# Patient Record
Sex: FEMALE | Race: ASIAN | NOT HISPANIC OR LATINO | URBAN - METROPOLITAN AREA
[De-identification: names, ages, dates, MRNs, and addresses within clinical notes are randomized per-mention and may not be internally consistent; named-entity substitution may affect disease eponyms.]

---

## 2017-08-11 ENCOUNTER — OUTPATIENT (OUTPATIENT)
Dept: OUTPATIENT SERVICES | Facility: HOSPITAL | Age: 57
LOS: 1 days | End: 2017-08-11

## 2017-08-11 DIAGNOSIS — Z00.8 ENCOUNTER FOR OTHER GENERAL EXAMINATION: ICD-10-CM

## 2017-08-31 PROBLEM — Z00.00 ENCOUNTER FOR PREVENTIVE HEALTH EXAMINATION: Status: ACTIVE | Noted: 2017-08-31

## 2017-09-21 ENCOUNTER — APPOINTMENT (OUTPATIENT)
Dept: MAMMOGRAPHY | Facility: IMAGING CENTER | Age: 57
End: 2017-09-21
Payer: COMMERCIAL

## 2017-09-21 ENCOUNTER — OUTPATIENT (OUTPATIENT)
Dept: OUTPATIENT SERVICES | Facility: HOSPITAL | Age: 57
LOS: 1 days | End: 2017-09-21
Payer: COMMERCIAL

## 2017-09-21 ENCOUNTER — RESULT REVIEW (OUTPATIENT)
Age: 57
End: 2017-09-21

## 2017-09-21 DIAGNOSIS — Z00.8 ENCOUNTER FOR OTHER GENERAL EXAMINATION: ICD-10-CM

## 2017-09-21 PROCEDURE — 19082 BX BREAST ADD LESION STRTCTC: CPT

## 2017-09-21 PROCEDURE — 77065 DX MAMMO INCL CAD UNI: CPT

## 2017-09-21 PROCEDURE — 88305 TISSUE EXAM BY PATHOLOGIST: CPT | Mod: 26

## 2017-09-21 PROCEDURE — G0206: CPT | Mod: 26,LT

## 2017-09-21 PROCEDURE — A4648: CPT

## 2017-09-21 PROCEDURE — 19082 BX BREAST ADD LESION STRTCTC: CPT | Mod: LT

## 2017-09-21 PROCEDURE — 19081 BX BREAST 1ST LESION STRTCTC: CPT

## 2017-09-21 PROCEDURE — 19081 BX BREAST 1ST LESION STRTCTC: CPT | Mod: LT

## 2017-09-21 PROCEDURE — 88305 TISSUE EXAM BY PATHOLOGIST: CPT

## 2017-10-03 LAB — SURGICAL PATHOLOGY STUDY: SIGNIFICANT CHANGE UP

## 2018-03-19 ENCOUNTER — RESULT REVIEW (OUTPATIENT)
Age: 58
End: 2018-03-19

## 2018-10-11 ENCOUNTER — APPOINTMENT (OUTPATIENT)
Dept: RHEUMATOLOGY | Facility: CLINIC | Age: 58
End: 2018-10-11
Payer: COMMERCIAL

## 2018-10-11 VITALS
BODY MASS INDEX: 30.18 KG/M2 | RESPIRATION RATE: 17 BRPM | HEIGHT: 62 IN | DIASTOLIC BLOOD PRESSURE: 70 MMHG | WEIGHT: 164 LBS | TEMPERATURE: 98 F | OXYGEN SATURATION: 98 % | SYSTOLIC BLOOD PRESSURE: 114 MMHG | HEART RATE: 61 BPM

## 2018-10-11 DIAGNOSIS — Z56.0 UNEMPLOYMENT, UNSPECIFIED: ICD-10-CM

## 2018-10-11 DIAGNOSIS — Z83.79 FAMILY HISTORY OF OTHER DISEASES OF THE DIGESTIVE SYSTEM: ICD-10-CM

## 2018-10-11 DIAGNOSIS — Z86.000 PERSONAL HISTORY OF IN-SITU NEOPLASM OF BREAST: ICD-10-CM

## 2018-10-11 PROCEDURE — 90686 IIV4 VACC NO PRSV 0.5 ML IM: CPT

## 2018-10-11 PROCEDURE — 99245 OFF/OP CONSLTJ NEW/EST HI 55: CPT | Mod: 25

## 2018-10-11 PROCEDURE — G0008: CPT

## 2018-10-11 RX ORDER — CELECOXIB 200 MG/1
200 CAPSULE ORAL
Refills: 0 | Status: COMPLETED | COMMUNITY

## 2018-10-11 RX ORDER — ELECTROLYTES/DEXTROSE
SOLUTION, ORAL ORAL
Refills: 0 | Status: ACTIVE | COMMUNITY
Start: 2018-10-11

## 2018-10-11 RX ORDER — METHOTREXATE 2.5 MG/1
2.5 TABLET ORAL
Refills: 0 | Status: COMPLETED | COMMUNITY

## 2018-10-11 RX ORDER — MV-MIN/FOLIC/VIT K/LYCOP/COQ10 200-100MCG
CAPSULE ORAL
Refills: 0 | Status: COMPLETED | COMMUNITY

## 2018-10-11 RX ORDER — FOLIC ACID 1 MG/1
1 TABLET ORAL
Refills: 0 | Status: COMPLETED | COMMUNITY

## 2018-10-11 SDOH — ECONOMIC STABILITY - INCOME SECURITY: UNEMPLOYMENT, UNSPECIFIED: Z56.0

## 2018-10-12 LAB
ALBUMIN SERPL ELPH-MCNC: 4.5 G/DL
ALP BLD-CCNC: 74 U/L
ALT SERPL-CCNC: 17 U/L
ANION GAP SERPL CALC-SCNC: 14 MMOL/L
AST SERPL-CCNC: 19 U/L
BASOPHILS # BLD AUTO: 0.02 K/UL
BASOPHILS NFR BLD AUTO: 0.3 %
BILIRUB SERPL-MCNC: 0.2 MG/DL
BUN SERPL-MCNC: 15 MG/DL
CALCIUM SERPL-MCNC: 9.4 MG/DL
CCP AB SER IA-ACNC: >250 UNITS
CHLORIDE SERPL-SCNC: 100 MMOL/L
CO2 SERPL-SCNC: 26 MMOL/L
CREAT SERPL-MCNC: 0.57 MG/DL
CRP SERPL-MCNC: 1.27 MG/DL
EOSINOPHIL # BLD AUTO: 0.15 K/UL
EOSINOPHIL NFR BLD AUTO: 2 %
ERYTHROCYTE [SEDIMENTATION RATE] IN BLOOD BY WESTERGREN METHOD: 63 MM/HR
GLUCOSE SERPL-MCNC: 68 MG/DL
HBV CORE IGG+IGM SER QL: NONREACTIVE
HBV SURFACE AB SER QL: NONREACTIVE
HBV SURFACE AG SER QL: NONREACTIVE
HCT VFR BLD CALC: 41.1 %
HCV AB SER QL: NONREACTIVE
HCV S/CO RATIO: 0.16 S/CO
HGB BLD-MCNC: 13 G/DL
IMM GRANULOCYTES NFR BLD AUTO: 0.1 %
LYMPHOCYTES # BLD AUTO: 3.31 K/UL
LYMPHOCYTES NFR BLD AUTO: 44.4 %
MAN DIFF?: NORMAL
MCHC RBC-ENTMCNC: 26.5 PG
MCHC RBC-ENTMCNC: 31.6 GM/DL
MCV RBC AUTO: 83.7 FL
MONOCYTES # BLD AUTO: 0.77 K/UL
MONOCYTES NFR BLD AUTO: 10.3 %
NEUTROPHILS # BLD AUTO: 3.19 K/UL
NEUTROPHILS NFR BLD AUTO: 42.9 %
PLATELET # BLD AUTO: 321 K/UL
POTASSIUM SERPL-SCNC: 5.1 MMOL/L
PROT SERPL-MCNC: 7.6 G/DL
RBC # BLD: 4.91 M/UL
RBC # FLD: 15.7 %
RF+CCP IGG SER-IMP: ABNORMAL
SODIUM SERPL-SCNC: 140 MMOL/L
WBC # FLD AUTO: 7.45 K/UL

## 2018-10-17 LAB
ANA PAT FLD IF-IMP: ABNORMAL
ANA SER IF-ACNC: ABNORMAL

## 2019-01-02 ENCOUNTER — FORM ENCOUNTER (OUTPATIENT)
Age: 59
End: 2019-01-02

## 2019-01-03 ENCOUNTER — OUTPATIENT (OUTPATIENT)
Dept: OUTPATIENT SERVICES | Facility: HOSPITAL | Age: 59
LOS: 1 days | End: 2019-01-03
Payer: COMMERCIAL

## 2019-01-03 ENCOUNTER — APPOINTMENT (OUTPATIENT)
Dept: MAMMOGRAPHY | Facility: CLINIC | Age: 59
End: 2019-01-03
Payer: COMMERCIAL

## 2019-01-03 DIAGNOSIS — Z00.00 ENCOUNTER FOR GENERAL ADULT MEDICAL EXAMINATION WITHOUT ABNORMAL FINDINGS: ICD-10-CM

## 2019-01-03 PROCEDURE — 73130 X-RAY EXAM OF HAND: CPT | Mod: 26,50

## 2019-01-03 PROCEDURE — G0279: CPT | Mod: 26

## 2019-01-03 PROCEDURE — 73630 X-RAY EXAM OF FOOT: CPT

## 2019-01-03 PROCEDURE — G0279: CPT

## 2019-01-03 PROCEDURE — 73630 X-RAY EXAM OF FOOT: CPT | Mod: 26,50

## 2019-01-03 PROCEDURE — 77066 DX MAMMO INCL CAD BI: CPT | Mod: 26

## 2019-01-03 PROCEDURE — 73130 X-RAY EXAM OF HAND: CPT

## 2019-01-03 PROCEDURE — 77066 DX MAMMO INCL CAD BI: CPT

## 2019-01-15 ENCOUNTER — APPOINTMENT (OUTPATIENT)
Dept: RHEUMATOLOGY | Facility: CLINIC | Age: 59
End: 2019-01-15
Payer: COMMERCIAL

## 2019-01-15 VITALS
HEART RATE: 70 BPM | RESPIRATION RATE: 16 BRPM | DIASTOLIC BLOOD PRESSURE: 78 MMHG | TEMPERATURE: 97.8 F | WEIGHT: 176 LBS | BODY MASS INDEX: 33.23 KG/M2 | HEIGHT: 61 IN | OXYGEN SATURATION: 98 % | SYSTOLIC BLOOD PRESSURE: 132 MMHG

## 2019-01-15 LAB
ALBUMIN SERPL ELPH-MCNC: 4 G/DL
ALP BLD-CCNC: 74 U/L
ALT SERPL-CCNC: 15 U/L
ANION GAP SERPL CALC-SCNC: 9 MMOL/L
AST SERPL-CCNC: 13 U/L
BILIRUB SERPL-MCNC: 0.2 MG/DL
BUN SERPL-MCNC: 16 MG/DL
CALCIUM SERPL-MCNC: 9.4 MG/DL
CHLORIDE SERPL-SCNC: 103 MMOL/L
CO2 SERPL-SCNC: 29 MMOL/L
CREAT SERPL-MCNC: 0.5 MG/DL
CRP SERPL-MCNC: 1.63 MG/DL
GLUCOSE SERPL-MCNC: 106 MG/DL
POTASSIUM SERPL-SCNC: 5 MMOL/L
PROT SERPL-MCNC: 7.3 G/DL
SODIUM SERPL-SCNC: 141 MMOL/L

## 2019-01-15 PROCEDURE — 99214 OFFICE O/P EST MOD 30 MIN: CPT | Mod: 25

## 2019-01-15 PROCEDURE — 36415 COLL VENOUS BLD VENIPUNCTURE: CPT

## 2019-01-15 NOTE — HISTORY OF PRESENT ILLNESS
[Arthralgias] : arthralgias [FreeTextEntry1] : Feeling fine since last visit.  No joint pain/swelling.  (+)AM stiffness, usually lasting up to 5-10 minutes.  No new complaints. [Anorexia] : no anorexia [Weight Loss] : no weight loss [Malaise] : no malaise [Fever] : no fever [Chills] : no chills [Fatigue] : no fatigue [Malar Facial Rash] : no malar facial rash [Skin Lesions] : no lesions [Skin Nodules] : no skin nodules [Oral Ulcers] : no oral ulcers [Cough] : no cough [Dry Mouth] : no dry mouth [Dysphagia] : no dysphagia [Shortness of Breath] : no shortness of breath [Chest Pain] : no chest pain [Joint Swelling] : no joint swelling [Joint Warmth] : no joint warmth [Joint Deformity] : no joint deformity [Decreased ROM] : no decreased range of motion [Morning Stiffness] : no morning stiffness [Falls] : no falls [Difficulty Standing] : no difficulty standing [Difficulty Walking] : no difficulty walking [Dyspnea] : no dyspnea [Myalgias] : no myalgias [Muscle Weakness] : no muscle weakness [Muscle Spasms] : no muscle spasms [Muscle Cramping] : no muscle cramping [Visual Changes] : no visual changes [Eye Pain] : no eye pain [Eye Redness] : no eye redness [Dry Eyes] : no dry eyes

## 2019-01-15 NOTE — PHYSICAL EXAM
[General Appearance - Alert] : alert [General Appearance - In No Acute Distress] : in no acute distress [Sclera] : the sclera and conjunctiva were normal [Outer Ear] : the ears and nose were normal in appearance [Oropharynx] : the oropharynx was normal [Neck Appearance] : the appearance of the neck was normal [Neck Cervical Mass (___cm)] : no neck mass was observed [Jugular Venous Distention Increased] : there was no jugular-venous distention [Thyroid Diffuse Enlargement] : the thyroid was not enlarged [Thyroid Nodule] : there were no palpable thyroid nodules [Auscultation Breath Sounds / Voice Sounds] : lungs were clear to auscultation bilaterally [Heart Rate And Rhythm] : heart rate was normal and rhythm regular [Heart Sounds] : normal S1 and S2 [Heart Sounds Gallop] : no gallops [Murmurs] : no murmurs [Heart Sounds Pericardial Friction Rub] : no pericardial rub [Edema] : there was no peripheral edema [Bowel Sounds] : normal bowel sounds [Abdomen Soft] : soft [Abdomen Tenderness] : non-tender [Abdomen Mass (___ Cm)] : no abdominal mass palpated [Cervical Lymph Nodes Enlarged Posterior Bilaterally] : posterior cervical [Cervical Lymph Nodes Enlarged Anterior Bilaterally] : anterior cervical [Supraclavicular Lymph Nodes Enlarged Bilaterally] : supraclavicular [No Spinal Tenderness] : no spinal tenderness [Skin Color & Pigmentation] : normal skin color and pigmentation [Skin Turgor] : normal skin turgor [] : no rash [No Focal Deficits] : no focal deficits [Oriented To Time, Place, And Person] : oriented to person, place, and time [Impaired Insight] : insight and judgment were intact [Affect] : the affect was normal [FreeTextEntry1] : No synovitis, right shoulder w/ mild pain upon internal and external rotation, (+)impingement;  otherwise, full ROM in all joints\par

## 2019-01-15 NOTE — ASSESSMENT
[FreeTextEntry1] : 58 year old female with long-standing RA (+RF, +CCP), well controlled on MTX, though w/ elevated ESR/CRP. Also found to have borderlline DAVIN, though no si/sx of other CTD.  Also RTC tendonopathy of the right shoulder.\par   - Cont MTX 12.5mg weekly, folic acid 1mg daily.\par   - Hepatitis panel negative 10/18.\par   - Flu vaccine(10/18) UTD.  Will plan to administer Prevnar at next visit.\par   - shoulder exercises\par   - Cont Celebrex prn.\par   - Check labs, including serologies.

## 2019-01-16 LAB
BASOPHILS # BLD AUTO: 0.02 K/UL
BASOPHILS NFR BLD AUTO: 0.3 %
C3 SERPL-MCNC: 183 MG/DL
C4 SERPL-MCNC: 17 MG/DL
EOSINOPHIL # BLD AUTO: 0.09 K/UL
EOSINOPHIL NFR BLD AUTO: 1.5 %
ERYTHROCYTE [SEDIMENTATION RATE] IN BLOOD BY WESTERGREN METHOD: 94 MM/HR
HCT VFR BLD CALC: 40.3 %
HGB BLD-MCNC: 12.7 G/DL
IMM GRANULOCYTES NFR BLD AUTO: 0.2 %
LYMPHOCYTES # BLD AUTO: 2.48 K/UL
LYMPHOCYTES NFR BLD AUTO: 42.7 %
MAN DIFF?: NORMAL
MCHC RBC-ENTMCNC: 26.8 PG
MCHC RBC-ENTMCNC: 31.5 GM/DL
MCV RBC AUTO: 85.2 FL
MONOCYTES # BLD AUTO: 0.59 K/UL
MONOCYTES NFR BLD AUTO: 10.2 %
NEUTROPHILS # BLD AUTO: 2.62 K/UL
NEUTROPHILS NFR BLD AUTO: 45.1 %
PLATELET # BLD AUTO: 310 K/UL
RBC # BLD: 4.73 M/UL
RBC # FLD: 15.7 %
THYROGLOB AB SERPL-ACNC: <20 IU/ML
THYROPEROXIDASE AB SERPL IA-ACNC: <10 IU/ML
WBC # FLD AUTO: 5.81 K/UL

## 2019-01-17 LAB
DSDNA AB SER-ACNC: <12 IU/ML
ENA RNP AB SER IA-ACNC: <0.2 AL
ENA SM AB SER IA-ACNC: <0.2 AL
ENA SS-A AB SER IA-ACNC: <0.2 AL
ENA SS-B AB SER IA-ACNC: <0.2 AL

## 2019-02-07 ENCOUNTER — TRANSCRIPTION ENCOUNTER (OUTPATIENT)
Age: 59
End: 2019-02-07

## 2019-02-21 ENCOUNTER — OUTPATIENT (OUTPATIENT)
Dept: OUTPATIENT SERVICES | Facility: HOSPITAL | Age: 59
LOS: 1 days | End: 2019-02-21
Payer: COMMERCIAL

## 2019-02-21 ENCOUNTER — APPOINTMENT (OUTPATIENT)
Dept: ULTRASOUND IMAGING | Facility: CLINIC | Age: 59
End: 2019-02-21
Payer: COMMERCIAL

## 2019-02-21 DIAGNOSIS — K11.1 HYPERTROPHY OF SALIVARY GLAND: ICD-10-CM

## 2019-02-21 DIAGNOSIS — R05 COUGH: ICD-10-CM

## 2019-02-21 DIAGNOSIS — Z00.8 ENCOUNTER FOR OTHER GENERAL EXAMINATION: ICD-10-CM

## 2019-02-21 PROCEDURE — 76536 US EXAM OF HEAD AND NECK: CPT | Mod: 26

## 2019-02-21 PROCEDURE — 76536 US EXAM OF HEAD AND NECK: CPT

## 2019-02-21 PROCEDURE — 71046 X-RAY EXAM CHEST 2 VIEWS: CPT

## 2019-02-21 PROCEDURE — 71046 X-RAY EXAM CHEST 2 VIEWS: CPT | Mod: 26

## 2019-03-26 ENCOUNTER — RX RENEWAL (OUTPATIENT)
Age: 59
End: 2019-03-26

## 2019-04-16 ENCOUNTER — APPOINTMENT (OUTPATIENT)
Dept: RHEUMATOLOGY | Facility: CLINIC | Age: 59
End: 2019-04-16
Payer: COMMERCIAL

## 2019-04-16 VITALS
RESPIRATION RATE: 17 BRPM | HEIGHT: 61 IN | SYSTOLIC BLOOD PRESSURE: 120 MMHG | HEART RATE: 62 BPM | WEIGHT: 170 LBS | BODY MASS INDEX: 32.1 KG/M2 | TEMPERATURE: 98 F | OXYGEN SATURATION: 98 % | DIASTOLIC BLOOD PRESSURE: 80 MMHG

## 2019-04-16 LAB
BASOPHILS # BLD AUTO: 0.02 K/UL
BASOPHILS NFR BLD AUTO: 0.3 %
EOSINOPHIL # BLD AUTO: 0.09 K/UL
EOSINOPHIL NFR BLD AUTO: 1.5 %
HCT VFR BLD CALC: 41.7 %
HGB BLD-MCNC: 12.4 G/DL
IMM GRANULOCYTES NFR BLD AUTO: 0.3 %
LYMPHOCYTES # BLD AUTO: 2.62 K/UL
LYMPHOCYTES NFR BLD AUTO: 44.1 %
MAN DIFF?: NORMAL
MCHC RBC-ENTMCNC: 26.4 PG
MCHC RBC-ENTMCNC: 29.7 GM/DL
MCV RBC AUTO: 88.9 FL
MONOCYTES # BLD AUTO: 0.62 K/UL
MONOCYTES NFR BLD AUTO: 10.4 %
NEUTROPHILS # BLD AUTO: 2.57 K/UL
NEUTROPHILS NFR BLD AUTO: 43.4 %
PLATELET # BLD AUTO: 279 K/UL
RBC # BLD: 4.69 M/UL
RBC # FLD: 15.6 %
WBC # FLD AUTO: 5.94 K/UL

## 2019-04-16 PROCEDURE — 36415 COLL VENOUS BLD VENIPUNCTURE: CPT

## 2019-04-16 PROCEDURE — 99215 OFFICE O/P EST HI 40 MIN: CPT | Mod: 25

## 2019-04-16 NOTE — HISTORY OF PRESENT ILLNESS
[Arthralgias] : arthralgias [Anorexia] : no anorexia [FreeTextEntry1] : Pt c/o pain in her B/L heels for the past month.  The pain occurs only with weight-bearing, worst in the mornings.  Otherwise, feeling fine.  No joint pain/swelling.  AM stiffness now occurs occasionally, lasting about 5 minutes.  \par Feeling fine since last visit.  No joint pain/swelling.  (+)AM stiffness, usually lasting up to 5-10 minutes.  No new complaints. [Malaise] : no malaise [Weight Loss] : no weight loss [Fever] : no fever [Fatigue] : no fatigue [Chills] : no chills [Skin Nodules] : no skin nodules [Malar Facial Rash] : no malar facial rash [Skin Lesions] : no lesions [Oral Ulcers] : no oral ulcers [Cough] : no cough [Shortness of Breath] : no shortness of breath [Dysphagia] : no dysphagia [Dry Mouth] : no dry mouth [Chest Pain] : no chest pain [Joint Swelling] : no joint swelling [Joint Deformity] : no joint deformity [Decreased ROM] : no decreased range of motion [Joint Warmth] : no joint warmth [Morning Stiffness] : no morning stiffness [Difficulty Standing] : no difficulty standing [Falls] : no falls [Difficulty Walking] : no difficulty walking [Dyspnea] : no dyspnea [Myalgias] : no myalgias [Muscle Spasms] : no muscle spasms [Muscle Weakness] : no muscle weakness [Visual Changes] : no visual changes [Muscle Cramping] : no muscle cramping [Eye Pain] : no eye pain [Dry Eyes] : no dry eyes [Eye Redness] : no eye redness

## 2019-04-16 NOTE — ASSESSMENT
[FreeTextEntry1] : 58 year old female with long-standing RA (+RF, +CCP), well controlled on MTX, though w/ elevated ESR/CRP.   Of note, recent CXR by PMD revealed a 2.5cm opacity of unclear etiology.  Also found to have borderline DAVIN, though no si/sx of other CT and all serologies negative.  Also RTC tendonopathy of the right shoulder.\par   - Cont MTX 12.5mg weekly, folic acid 1mg daily.\par   - Hepatitis panel negative 10/18.\par   - Flu vaccine(10/18) UTD.  Will plan to administer Prevnar at next visit.\par   - shoulder exercises\par   - Cont Celebrex prn.\par   - Check labs.\par   - Further w/u for lung opacity as per PMD.  Asked pt to have results of any testing sent to me for review.

## 2019-04-16 NOTE — PHYSICAL EXAM
[General Appearance - In No Acute Distress] : in no acute distress [General Appearance - Alert] : alert [Sclera] : the sclera and conjunctiva were normal [Outer Ear] : the ears and nose were normal in appearance [Oropharynx] : the oropharynx was normal [Neck Cervical Mass (___cm)] : no neck mass was observed [Neck Appearance] : the appearance of the neck was normal [Thyroid Diffuse Enlargement] : the thyroid was not enlarged [Jugular Venous Distention Increased] : there was no jugular-venous distention [Thyroid Nodule] : there were no palpable thyroid nodules [Auscultation Breath Sounds / Voice Sounds] : lungs were clear to auscultation bilaterally [Heart Rate And Rhythm] : heart rate was normal and rhythm regular [Heart Sounds] : normal S1 and S2 [Murmurs] : no murmurs [Heart Sounds Gallop] : no gallops [Edema] : there was no peripheral edema [Heart Sounds Pericardial Friction Rub] : no pericardial rub [Bowel Sounds] : normal bowel sounds [Abdomen Soft] : soft [Abdomen Tenderness] : non-tender [Abdomen Mass (___ Cm)] : no abdominal mass palpated [Cervical Lymph Nodes Enlarged Posterior Bilaterally] : posterior cervical [Cervical Lymph Nodes Enlarged Anterior Bilaterally] : anterior cervical [No Spinal Tenderness] : no spinal tenderness [Supraclavicular Lymph Nodes Enlarged Bilaterally] : supraclavicular [Skin Color & Pigmentation] : normal skin color and pigmentation [Skin Turgor] : normal skin turgor [] : no rash [Oriented To Time, Place, And Person] : oriented to person, place, and time [No Focal Deficits] : no focal deficits [Impaired Insight] : insight and judgment were intact [Affect] : the affect was normal [FreeTextEntry1] : No synovitis, right shoulder w/ mild pain upon internal and external rotation, (+)impingement;  otherwise, full ROM in all joints\par

## 2019-04-17 LAB
ALBUMIN SERPL ELPH-MCNC: 4.3 G/DL
ALP BLD-CCNC: 73 U/L
ALT SERPL-CCNC: 18 U/L
ANION GAP SERPL CALC-SCNC: 12 MMOL/L
AST SERPL-CCNC: 17 U/L
BILIRUB SERPL-MCNC: 0.3 MG/DL
BUN SERPL-MCNC: 10 MG/DL
CALCIUM SERPL-MCNC: 8.9 MG/DL
CHLORIDE SERPL-SCNC: 103 MMOL/L
CO2 SERPL-SCNC: 26 MMOL/L
CREAT SERPL-MCNC: 0.5 MG/DL
CRP SERPL-MCNC: 0.65 MG/DL
ERYTHROCYTE [SEDIMENTATION RATE] IN BLOOD BY WESTERGREN METHOD: 73 MM/HR
GLUCOSE SERPL-MCNC: 102 MG/DL
POTASSIUM SERPL-SCNC: 4.6 MMOL/L
PROT SERPL-MCNC: 7.2 G/DL
SODIUM SERPL-SCNC: 141 MMOL/L

## 2019-05-10 ENCOUNTER — TRANSCRIPTION ENCOUNTER (OUTPATIENT)
Age: 59
End: 2019-05-10

## 2019-07-16 ENCOUNTER — APPOINTMENT (OUTPATIENT)
Dept: RHEUMATOLOGY | Facility: CLINIC | Age: 59
End: 2019-07-16
Payer: COMMERCIAL

## 2019-07-16 VITALS
BODY MASS INDEX: 33.04 KG/M2 | DIASTOLIC BLOOD PRESSURE: 76 MMHG | TEMPERATURE: 98.8 F | SYSTOLIC BLOOD PRESSURE: 120 MMHG | OXYGEN SATURATION: 98 % | HEART RATE: 71 BPM | WEIGHT: 175 LBS | HEIGHT: 61 IN | RESPIRATION RATE: 17 BRPM

## 2019-07-16 PROCEDURE — 90670 PCV13 VACCINE IM: CPT

## 2019-07-16 PROCEDURE — G0009: CPT

## 2019-07-16 PROCEDURE — 99215 OFFICE O/P EST HI 40 MIN: CPT | Mod: 25

## 2019-07-16 PROCEDURE — 36415 COLL VENOUS BLD VENIPUNCTURE: CPT

## 2019-07-16 NOTE — HISTORY OF PRESENT ILLNESS
[Arthralgias] : arthralgias [FreeTextEntry1] : Continues to feel fine overall. Still w/ intermittent pain in her B/L heels.  Otherwise, no joint pain/swelling.  AM stiffness now occurs occasionally, lasting about 5 minutes.  No new complaints. [Anorexia] : no anorexia [Weight Loss] : no weight loss [Malaise] : no malaise [Fever] : no fever [Chills] : no chills [Fatigue] : no fatigue [Malar Facial Rash] : no malar facial rash [Skin Lesions] : no lesions [Skin Nodules] : no skin nodules [Oral Ulcers] : no oral ulcers [Cough] : no cough [Dry Mouth] : no dry mouth [Dysphagia] : no dysphagia [Shortness of Breath] : no shortness of breath [Chest Pain] : no chest pain [Joint Swelling] : no joint swelling [Joint Warmth] : no joint warmth [Joint Deformity] : no joint deformity [Decreased ROM] : no decreased range of motion [Morning Stiffness] : no morning stiffness [Falls] : no falls [Difficulty Standing] : no difficulty standing [Difficulty Walking] : no difficulty walking [Dyspnea] : no dyspnea [Myalgias] : no myalgias [Muscle Weakness] : no muscle weakness [Muscle Spasms] : no muscle spasms [Muscle Cramping] : no muscle cramping [Visual Changes] : no visual changes [Eye Pain] : no eye pain [Eye Redness] : no eye redness [Dry Eyes] : no dry eyes

## 2019-07-16 NOTE — ASSESSMENT
[FreeTextEntry1] : 58 year old female with:\par 1)  Long-standing RA (+RF, +CCP), well controlled on MTX, though w/ elevated ESR/CRP.\par   - Cont MTX 12.5mg weekly, folic acid 1mg daily.\par   - Hepatitis panel negative 10/18.\par   - Flu vaccine(10/18) UTD.  Administered Prevnar.\par   - Check labs.\par 2)  Pain in B/L heels:  most c/w plantar fasciitis\par   - heel stretching exercises\par   - Celebrex prn\par   - roll frozen water bottle under heels\par 3)  Pain in shoulders (R>L):  most c/w RTC tendinopathy\par   - shoulder exercises\par   - Celebrex prn\par 4)  Borderline DAVIN:  no si/sx of other CTD and all serologies negative - ?due to RA\par 5)  Recent CXR by PMD revealed a 2.5cm opacity of unclear etiology.  Reportedly was stable upon repeat.\par   - f/u w/ PMD - reportedly planning to repeat again in 6 months.  Asked pt to have copy of results sent to me for review.

## 2019-07-17 LAB
ALBUMIN SERPL ELPH-MCNC: 4.2 G/DL
ALP BLD-CCNC: 67 U/L
ALT SERPL-CCNC: 16 U/L
ANION GAP SERPL CALC-SCNC: 13 MMOL/L
AST SERPL-CCNC: 17 U/L
BASOPHILS # BLD AUTO: 0.03 K/UL
BASOPHILS NFR BLD AUTO: 0.5 %
BILIRUB SERPL-MCNC: 0.3 MG/DL
BUN SERPL-MCNC: 15 MG/DL
CALCIUM SERPL-MCNC: 8.9 MG/DL
CHLORIDE SERPL-SCNC: 104 MMOL/L
CO2 SERPL-SCNC: 23 MMOL/L
CREAT SERPL-MCNC: 0.53 MG/DL
CRP SERPL-MCNC: 1.26 MG/DL
EOSINOPHIL # BLD AUTO: 0.3 K/UL
EOSINOPHIL NFR BLD AUTO: 5.2 %
ERYTHROCYTE [SEDIMENTATION RATE] IN BLOOD BY WESTERGREN METHOD: 93 MM/HR
GLUCOSE SERPL-MCNC: 104 MG/DL
HCT VFR BLD CALC: 41.6 %
HGB BLD-MCNC: 12.6 G/DL
IMM GRANULOCYTES NFR BLD AUTO: 0.2 %
LYMPHOCYTES # BLD AUTO: 2.43 K/UL
LYMPHOCYTES NFR BLD AUTO: 41.8 %
MAN DIFF?: NORMAL
MCHC RBC-ENTMCNC: 26.8 PG
MCHC RBC-ENTMCNC: 30.3 GM/DL
MCV RBC AUTO: 88.5 FL
MONOCYTES # BLD AUTO: 0.65 K/UL
MONOCYTES NFR BLD AUTO: 11.2 %
NEUTROPHILS # BLD AUTO: 2.4 K/UL
NEUTROPHILS NFR BLD AUTO: 41.1 %
PLATELET # BLD AUTO: 260 K/UL
POTASSIUM SERPL-SCNC: 4.5 MMOL/L
PROT SERPL-MCNC: 7.1 G/DL
RBC # BLD: 4.7 M/UL
RBC # FLD: 14.8 %
SODIUM SERPL-SCNC: 140 MMOL/L
WBC # FLD AUTO: 5.82 K/UL

## 2019-10-24 ENCOUNTER — APPOINTMENT (OUTPATIENT)
Dept: RHEUMATOLOGY | Facility: CLINIC | Age: 59
End: 2019-10-24
Payer: COMMERCIAL

## 2019-10-24 VITALS
BODY MASS INDEX: 33.04 KG/M2 | WEIGHT: 175 LBS | RESPIRATION RATE: 17 BRPM | HEIGHT: 61 IN | SYSTOLIC BLOOD PRESSURE: 124 MMHG | TEMPERATURE: 98.8 F | OXYGEN SATURATION: 97 % | DIASTOLIC BLOOD PRESSURE: 82 MMHG | HEART RATE: 81 BPM

## 2019-10-24 LAB
ALBUMIN SERPL ELPH-MCNC: 4.2 G/DL
ALP BLD-CCNC: 71 U/L
ALT SERPL-CCNC: 15 U/L
ANION GAP SERPL CALC-SCNC: 12 MMOL/L
AST SERPL-CCNC: 19 U/L
BASOPHILS # BLD AUTO: 0.03 K/UL
BASOPHILS NFR BLD AUTO: 0.5 %
BILIRUB SERPL-MCNC: 0.4 MG/DL
BUN SERPL-MCNC: 10 MG/DL
CALCIUM SERPL-MCNC: 9.4 MG/DL
CHLORIDE SERPL-SCNC: 102 MMOL/L
CO2 SERPL-SCNC: 26 MMOL/L
CREAT SERPL-MCNC: 0.57 MG/DL
CRP SERPL-MCNC: 1.46 MG/DL
EOSINOPHIL # BLD AUTO: 0.18 K/UL
EOSINOPHIL NFR BLD AUTO: 2.8 %
ERYTHROCYTE [SEDIMENTATION RATE] IN BLOOD BY WESTERGREN METHOD: 95 MM/HR
GLUCOSE SERPL-MCNC: 95 MG/DL
HCT VFR BLD CALC: 41.9 %
HGB BLD-MCNC: 13 G/DL
IMM GRANULOCYTES NFR BLD AUTO: 0.3 %
LYMPHOCYTES # BLD AUTO: 1.64 K/UL
LYMPHOCYTES NFR BLD AUTO: 25.7 %
MAN DIFF?: NORMAL
MCHC RBC-ENTMCNC: 26.3 PG
MCHC RBC-ENTMCNC: 31 GM/DL
MCV RBC AUTO: 84.8 FL
MONOCYTES # BLD AUTO: 0.75 K/UL
MONOCYTES NFR BLD AUTO: 11.7 %
NEUTROPHILS # BLD AUTO: 3.77 K/UL
NEUTROPHILS NFR BLD AUTO: 59 %
PLATELET # BLD AUTO: 271 K/UL
POTASSIUM SERPL-SCNC: 4.5 MMOL/L
PROT SERPL-MCNC: 7.5 G/DL
RBC # BLD: 4.94 M/UL
RBC # FLD: 15.7 %
SODIUM SERPL-SCNC: 140 MMOL/L
WBC # FLD AUTO: 6.39 K/UL

## 2019-10-24 PROCEDURE — 36415 COLL VENOUS BLD VENIPUNCTURE: CPT

## 2019-10-24 PROCEDURE — 99215 OFFICE O/P EST HI 40 MIN: CPT | Mod: 25

## 2019-10-24 NOTE — PHYSICAL EXAM
[General Appearance - Alert] : alert [General Appearance - In No Acute Distress] : in no acute distress [Sclera] : the sclera and conjunctiva were normal [Outer Ear] : the ears and nose were normal in appearance [Oropharynx] : the oropharynx was normal [Neck Appearance] : the appearance of the neck was normal [Neck Cervical Mass (___cm)] : no neck mass was observed [Jugular Venous Distention Increased] : there was no jugular-venous distention [Thyroid Diffuse Enlargement] : the thyroid was not enlarged [Thyroid Nodule] : there were no palpable thyroid nodules [Auscultation Breath Sounds / Voice Sounds] : lungs were clear to auscultation bilaterally [Heart Rate And Rhythm] : heart rate was normal and rhythm regular [Heart Sounds] : normal S1 and S2 [Heart Sounds Gallop] : no gallops [Heart Sounds Pericardial Friction Rub] : no pericardial rub [Murmurs] : no murmurs [Edema] : there was no peripheral edema [Bowel Sounds] : normal bowel sounds [Abdomen Soft] : soft [Abdomen Tenderness] : non-tender [Abdomen Mass (___ Cm)] : no abdominal mass palpated [Cervical Lymph Nodes Enlarged Posterior Bilaterally] : posterior cervical [Cervical Lymph Nodes Enlarged Anterior Bilaterally] : anterior cervical [Supraclavicular Lymph Nodes Enlarged Bilaterally] : supraclavicular [No Spinal Tenderness] : no spinal tenderness [Skin Color & Pigmentation] : normal skin color and pigmentation [Skin Turgor] : normal skin turgor [] : no rash [No Focal Deficits] : no focal deficits [Oriented To Time, Place, And Person] : oriented to person, place, and time [Impaired Insight] : insight and judgment were intact [Affect] : the affect was normal [FreeTextEntry1] : No synovitis, right shoulder w/ mild pain upon internal and external rotation, (+)impingement;  otherwise, full ROM in all joints\par

## 2019-10-24 NOTE — HISTORY OF PRESENT ILLNESS
[Arthralgias] : arthralgias [FreeTextEntry1] : Currently feeling fine, though she reports that she experienced 2 flares about 2 months ago - first in the hands, then in her shoulders and hips.  Each flare lasted about 5-6 days before resolving.  Pt currently c/o flu-like illness.\par Continues to feel fine overall. Still w/ intermittent pain in her B/L heels.  Otherwise, no joint pain/swelling.  AM stiffness now occurs occasionally, lasting about 5 minutes.  No new complaints. [Anorexia] : no anorexia [Weight Loss] : no weight loss [Malaise] : no malaise [Fever] : no fever [Chills] : no chills [Fatigue] : no fatigue [Malar Facial Rash] : no malar facial rash [Skin Nodules] : no skin nodules [Skin Lesions] : no lesions [Cough] : no cough [Oral Ulcers] : no oral ulcers [Dysphagia] : no dysphagia [Shortness of Breath] : no shortness of breath [Dry Mouth] : no dry mouth [Joint Swelling] : no joint swelling [Chest Pain] : no chest pain [Joint Deformity] : no joint deformity [Joint Warmth] : no joint warmth [Decreased ROM] : no decreased range of motion [Falls] : no falls [Morning Stiffness] : no morning stiffness [Difficulty Walking] : no difficulty walking [Difficulty Standing] : no difficulty standing [Dyspnea] : no dyspnea [Myalgias] : no myalgias [Muscle Weakness] : no muscle weakness [Muscle Spasms] : no muscle spasms [Visual Changes] : no visual changes [Muscle Cramping] : no muscle cramping [Eye Pain] : no eye pain [Eye Redness] : no eye redness [Dry Eyes] : no dry eyes

## 2019-10-24 NOTE — ASSESSMENT
[FreeTextEntry1] : 58 year old female with:\par 1)  Long-standing RA (+RF, +CCP), well controlled on MTX, though w/ elevated ESR/CRP.\par   - Cont MTX 12.5mg weekly, folic acid 1mg daily.\par   - Hepatitis panel negative 10/18.\par   - Flu vaccine(10/19, per pt), Prevnar (7/19) UTD.\par   - Check labs.\par 2)  Pain in B/L heels:  most c/w plantar fasciitis\par   - heel stretching exercises\par   - Celebrex prn\par   - roll frozen water bottle under heels\par 3)  Pain in shoulders (R>L):  most c/w RTC tendinopathy\par   - shoulder exercises\par   - Celebrex prn\par 4)  Borderline DAVIN:  no si/sx of other CTD and all serologies negative - ?due to RA\par 5)  Recent CXR by PMD revealed a 2.5cm opacity of unclear etiology.  Reportedly was stable upon repeat.\par   - f/u w/ PMD\par 6)  Fu-like illness:\par   - Advised pt to see PMD or go to an urgent care center for flu testing.\par 7)  Persistently elevated ESR:  unlikely due to RA, as pt well controlled clinically\par   - Advised pt to f/u w/ PMD for further evaluation into other possible etiologies.

## 2020-01-23 ENCOUNTER — APPOINTMENT (OUTPATIENT)
Dept: RHEUMATOLOGY | Facility: CLINIC | Age: 60
End: 2020-01-23
Payer: COMMERCIAL

## 2020-01-23 VITALS
SYSTOLIC BLOOD PRESSURE: 132 MMHG | HEIGHT: 61 IN | OXYGEN SATURATION: 99 % | DIASTOLIC BLOOD PRESSURE: 86 MMHG | HEART RATE: 75 BPM | WEIGHT: 160 LBS | RESPIRATION RATE: 17 BRPM | BODY MASS INDEX: 30.21 KG/M2

## 2020-01-23 LAB
ALBUMIN SERPL ELPH-MCNC: 4.5 G/DL
ALP BLD-CCNC: 75 U/L
ALT SERPL-CCNC: 15 U/L
ANION GAP SERPL CALC-SCNC: 13 MMOL/L
AST SERPL-CCNC: 14 U/L
BASOPHILS # BLD AUTO: 0.03 K/UL
BASOPHILS NFR BLD AUTO: 0.5 %
BILIRUB SERPL-MCNC: 0.2 MG/DL
BUN SERPL-MCNC: 14 MG/DL
CALCIUM SERPL-MCNC: 9.5 MG/DL
CHLORIDE SERPL-SCNC: 102 MMOL/L
CO2 SERPL-SCNC: 26 MMOL/L
CREAT SERPL-MCNC: 0.55 MG/DL
CRP SERPL-MCNC: 1.47 MG/DL
EOSINOPHIL # BLD AUTO: 0.1 K/UL
EOSINOPHIL NFR BLD AUTO: 1.7 %
ERYTHROCYTE [SEDIMENTATION RATE] IN BLOOD BY WESTERGREN METHOD: 120 MM/HR
GLUCOSE SERPL-MCNC: 107 MG/DL
HCT VFR BLD CALC: 43.4 %
HGB BLD-MCNC: 13.2 G/DL
IMM GRANULOCYTES NFR BLD AUTO: 0.2 %
LYMPHOCYTES # BLD AUTO: 2.28 K/UL
LYMPHOCYTES NFR BLD AUTO: 38.4 %
MAN DIFF?: NORMAL
MCHC RBC-ENTMCNC: 26 PG
MCHC RBC-ENTMCNC: 30.4 GM/DL
MCV RBC AUTO: 85.6 FL
MONOCYTES # BLD AUTO: 0.53 K/UL
MONOCYTES NFR BLD AUTO: 8.9 %
NEUTROPHILS # BLD AUTO: 2.99 K/UL
NEUTROPHILS NFR BLD AUTO: 50.3 %
PLATELET # BLD AUTO: 318 K/UL
POTASSIUM SERPL-SCNC: 4.6 MMOL/L
PROT SERPL-MCNC: 7.6 G/DL
RBC # BLD: 5.07 M/UL
RBC # FLD: 15.9 %
SODIUM SERPL-SCNC: 141 MMOL/L
WBC # FLD AUTO: 5.94 K/UL

## 2020-01-23 PROCEDURE — 36415 COLL VENOUS BLD VENIPUNCTURE: CPT

## 2020-01-23 PROCEDURE — 99214 OFFICE O/P EST MOD 30 MIN: CPT | Mod: 25

## 2020-01-23 NOTE — PHYSICAL EXAM
[General Appearance - Alert] : alert [Sclera] : the sclera and conjunctiva were normal [General Appearance - In No Acute Distress] : in no acute distress [Oropharynx] : the oropharynx was normal [Outer Ear] : the ears and nose were normal in appearance [Thyroid Diffuse Enlargement] : the thyroid was not enlarged [Jugular Venous Distention Increased] : there was no jugular-venous distention [Neck Cervical Mass (___cm)] : no neck mass was observed [Neck Appearance] : the appearance of the neck was normal [Thyroid Nodule] : there were no palpable thyroid nodules [Heart Rate And Rhythm] : heart rate was normal and rhythm regular [Heart Sounds] : normal S1 and S2 [Auscultation Breath Sounds / Voice Sounds] : lungs were clear to auscultation bilaterally [Murmurs] : no murmurs [Heart Sounds Gallop] : no gallops [Heart Sounds Pericardial Friction Rub] : no pericardial rub [Edema] : there was no peripheral edema [Bowel Sounds] : normal bowel sounds [Abdomen Tenderness] : non-tender [Abdomen Soft] : soft [Abdomen Mass (___ Cm)] : no abdominal mass palpated [Cervical Lymph Nodes Enlarged Posterior Bilaterally] : posterior cervical [Cervical Lymph Nodes Enlarged Anterior Bilaterally] : anterior cervical [Supraclavicular Lymph Nodes Enlarged Bilaterally] : supraclavicular [No Spinal Tenderness] : no spinal tenderness [Skin Turgor] : normal skin turgor [Skin Color & Pigmentation] : normal skin color and pigmentation [] : no rash [Impaired Insight] : insight and judgment were intact [Oriented To Time, Place, And Person] : oriented to person, place, and time [No Focal Deficits] : no focal deficits [Affect] : the affect was normal [FreeTextEntry1] : No synovitis, right shoulder w/ mild pain upon internal and external rotation, (+)impingement;  otherwise, full ROM in all joints\par

## 2020-01-23 NOTE — HISTORY OF PRESENT ILLNESS
[Arthralgias] : arthralgias [FreeTextEntry1] : Continues to feel fine overall.  No current pain/swelling/AM stiffness, though w/ occasional flares in her hands, usually lasting 1 day at a time.  Only other complaint is "clicking" in her neck and hips (L>R), though painless. [Anorexia] : no anorexia [Weight Loss] : no weight loss [Malaise] : no malaise [Fever] : no fever [Chills] : no chills [Fatigue] : no fatigue [Malar Facial Rash] : no malar facial rash [Skin Lesions] : no lesions [Skin Nodules] : no skin nodules [Oral Ulcers] : no oral ulcers [Cough] : no cough [Dry Mouth] : no dry mouth [Dysphagia] : no dysphagia [Shortness of Breath] : no shortness of breath [Chest Pain] : no chest pain [Joint Swelling] : no joint swelling [Joint Warmth] : no joint warmth [Joint Deformity] : no joint deformity [Decreased ROM] : no decreased range of motion [Morning Stiffness] : no morning stiffness [Falls] : no falls [Difficulty Standing] : no difficulty standing [Difficulty Walking] : no difficulty walking [Dyspnea] : no dyspnea [Myalgias] : no myalgias [Muscle Spasms] : no muscle spasms [Muscle Weakness] : no muscle weakness [Muscle Cramping] : no muscle cramping [Visual Changes] : no visual changes [Eye Pain] : no eye pain [Eye Redness] : no eye redness [Dry Eyes] : no dry eyes

## 2020-01-23 NOTE — ASSESSMENT
[FreeTextEntry1] : 58 year old female with:\par 1)  Long-standing RA (+RF, +CCP), well controlled on MTX, though w/ persistently elevated ESR/CRP.\par   - Increase MTX to 15mg weekly, cont folic acid 1mg daily.\par   - Hepatitis panel negative 10/18.\par   - Flu vaccine(10/19, per pt), Prevnar (7/19) UTD.\par   - Check labs.\par 2)  Pain in B/L heels:  most c/w plantar fasciitis - resolved\par   - heel stretching exercises\par   - Celebrex prn\par   - roll frozen water bottle under heels\par 3)  Pain in shoulders (R>L):  most c/w RTC tendinopathy - much improved\par   - shoulder exercises\par   - Celebrex prn\par 4)  Borderline DAVIN:  no si/sx of other CTD and all serologies negative - ?due to RA\par 5)  Recent CXR by PMD revealed a 2.5cm opacity of unclear etiology.  Reportedly was stable upon repeat.\par   - f/u w/ PMD\par 6)  Fu-like illness: resolved\par 7)  Persistently elevated ESR:  unlikely due to RA, as pt well controlled clinically\par   - Advised pt to f/u w/ PMD for further evaluation into other possible etiologies.\par 8)  Painless clicking in neck and hips (L>R):\par   - x-rays.

## 2020-01-24 LAB
ALBUMIN MFR SERPL ELPH: 52 %
ALBUMIN SERPL-MCNC: 4 G/DL
ALBUMIN/GLOB SERPL: 1.1 RATIO
ALPHA1 GLOB MFR SERPL ELPH: 4.3 %
ALPHA1 GLOB SERPL ELPH-MCNC: 0.3 G/DL
ALPHA2 GLOB MFR SERPL ELPH: 11.2 %
ALPHA2 GLOB SERPL ELPH-MCNC: 0.9 G/DL
B-GLOBULIN MFR SERPL ELPH: 13.4 %
B-GLOBULIN SERPL ELPH-MCNC: 1 G/DL
DEPRECATED KAPPA LC FREE/LAMBDA SER: 1.02 RATIO
GAMMA GLOB FLD ELPH-MCNC: 1.5 G/DL
GAMMA GLOB MFR SERPL ELPH: 19.1 %
IGA SER QL IEP: 234 MG/DL
IGG SER QL IEP: 1591 MG/DL
IGM SER QL IEP: 28 MG/DL
INTERPRETATION SERPL IEP-IMP: NORMAL
KAPPA LC CSF-MCNC: 8.37 MG/DL
KAPPA LC SERPL-MCNC: 8.55 MG/DL
M PROTEIN SPEC IFE-MCNC: NORMAL
PROT SERPL-MCNC: 7.6 G/DL
PROT SERPL-MCNC: 7.6 G/DL

## 2020-02-19 ENCOUNTER — FORM ENCOUNTER (OUTPATIENT)
Age: 60
End: 2020-02-19

## 2020-02-20 ENCOUNTER — APPOINTMENT (OUTPATIENT)
Dept: ULTRASOUND IMAGING | Facility: CLINIC | Age: 60
End: 2020-02-20
Payer: COMMERCIAL

## 2020-02-20 ENCOUNTER — OUTPATIENT (OUTPATIENT)
Dept: OUTPATIENT SERVICES | Facility: HOSPITAL | Age: 60
LOS: 1 days | End: 2020-02-20
Payer: COMMERCIAL

## 2020-02-20 DIAGNOSIS — M06.9 RHEUMATOID ARTHRITIS, UNSPECIFIED: ICD-10-CM

## 2020-02-20 DIAGNOSIS — Z00.00 ENCOUNTER FOR GENERAL ADULT MEDICAL EXAMINATION WITHOUT ABNORMAL FINDINGS: ICD-10-CM

## 2020-02-20 PROCEDURE — 76700 US EXAM ABDOM COMPLETE: CPT

## 2020-02-20 PROCEDURE — 72040 X-RAY EXAM NECK SPINE 2-3 VW: CPT | Mod: 26

## 2020-02-20 PROCEDURE — 73521 X-RAY EXAM HIPS BI 2 VIEWS: CPT

## 2020-02-20 PROCEDURE — 76700 US EXAM ABDOM COMPLETE: CPT | Mod: 26

## 2020-02-20 PROCEDURE — 72040 X-RAY EXAM NECK SPINE 2-3 VW: CPT

## 2020-02-20 PROCEDURE — 73521 X-RAY EXAM HIPS BI 2 VIEWS: CPT | Mod: 26

## 2020-06-04 ENCOUNTER — APPOINTMENT (OUTPATIENT)
Dept: RHEUMATOLOGY | Facility: CLINIC | Age: 60
End: 2020-06-04
Payer: COMMERCIAL

## 2020-06-04 PROCEDURE — 99214 OFFICE O/P EST MOD 30 MIN: CPT | Mod: 95

## 2020-06-04 NOTE — PHYSICAL EXAM
[General Appearance - Alert] : alert [General Appearance - In No Acute Distress] : in no acute distress [Sclera] : the sclera and conjunctiva were normal [Skin Color & Pigmentation] : normal skin color and pigmentation [] : no rash [Skin Turgor] : normal skin turgor [Impaired Insight] : insight and judgment were intact [Oriented To Time, Place, And Person] : oriented to person, place, and time [Affect] : the affect was normal [FreeTextEntry1] : No visible joint swelling; full ROM in all joints

## 2020-06-04 NOTE — HISTORY OF PRESENT ILLNESS
[Home] : at home, [unfilled] , at the time of the visit. [Medical Office: ([facility])___] : at the medical office located in  [Verbal consent obtained from patient] : the patient, [unfilled] [Arthralgias] : arthralgias [FreeTextEntry1] : Pt reports that she increased MTX to 12.5mg weekly.  Feeling fine overall since last visit, though still w/ occasional joint pains, tricia in her hands.   "Clicking" in her neck and hips (L>R) now occurring less frequently. [Weight Loss] : no weight loss [Anorexia] : no anorexia [Malaise] : no malaise [Fever] : no fever [Chills] : no chills [Fatigue] : no fatigue [Skin Lesions] : no lesions [Malar Facial Rash] : no malar facial rash [Skin Nodules] : no skin nodules [Oral Ulcers] : no oral ulcers [Cough] : no cough [Dry Mouth] : no dry mouth [Dysphagia] : no dysphagia [Shortness of Breath] : no shortness of breath [Chest Pain] : no chest pain [Joint Swelling] : no joint swelling [Joint Warmth] : no joint warmth [Joint Deformity] : no joint deformity [Decreased ROM] : no decreased range of motion [Morning Stiffness] : no morning stiffness [Falls] : no falls [Difficulty Standing] : no difficulty standing [Dyspnea] : no dyspnea [Difficulty Walking] : no difficulty walking [Muscle Weakness] : no muscle weakness [Myalgias] : no myalgias [Muscle Spasms] : no muscle spasms [Visual Changes] : no visual changes [Muscle Cramping] : no muscle cramping [Eye Pain] : no eye pain [Dry Eyes] : no dry eyes [Eye Redness] : no eye redness

## 2020-06-04 NOTE — ASSESSMENT
[FreeTextEntry1] : 58 year old female with:\par 1)  Long-standing RA (+RF, +CCP), well controlled on MTX, though w/ persistently elevated ESR/CRP.\par   - Continue MTX 12.5mg weekly, folic acid 1mg daily.\par   - Hepatitis panel negative 10/18.\par   - Flu vaccine(10/19, per pt), Prevnar (7/19) UTD.\par   - Check labs.\par 2)  Pain in B/L heels:  most c/w plantar fasciitis - resolved\par   - heel stretching exercises\par   - Celebrex prn\par   - roll frozen water bottle under heels prn\par 3)  Pain in shoulders (R>L):  most c/w RTC tendinopathy - virtually resolved\par   - shoulder exercises\par   - Celebrex prn\par 4)  Borderline DAVIN:  no si/sx of other CTD and all serologies negative - ?due to RA\par 5)  Recent CXR by PMD revealed a 2.5cm opacity of unclear etiology.  Reportedly was stable upon repeat.\par   - f/u w/ PMD\par 6)  Persistently elevated ESR:  unlikely due to RA, as pt well controlled clinically\par   - Again advised pt to f/u w/ PMD for further evaluation into other possible etiologies.\par 7)  Painless clicking in neck and hips (L>R):  improving / occurring less frequently\par   - Cont exercises.

## 2020-08-29 ENCOUNTER — INPATIENT (INPATIENT)
Facility: HOSPITAL | Age: 60
LOS: 2 days | Discharge: HOME | DRG: 872 | End: 2020-09-01
Attending: FAMILY MEDICINE | Admitting: HOSPITALIST
Payer: COMMERCIAL

## 2020-08-29 VITALS
HEIGHT: 64 IN | WEIGHT: 179.9 LBS | RESPIRATION RATE: 18 BRPM | OXYGEN SATURATION: 94 % | DIASTOLIC BLOOD PRESSURE: 80 MMHG | HEART RATE: 106 BPM | SYSTOLIC BLOOD PRESSURE: 128 MMHG | TEMPERATURE: 101 F

## 2020-08-29 DIAGNOSIS — Z90.49 ACQUIRED ABSENCE OF OTHER SPECIFIED PARTS OF DIGESTIVE TRACT: Chronic | ICD-10-CM

## 2020-08-29 DIAGNOSIS — A41.9 SEPSIS, UNSPECIFIED ORGANISM: ICD-10-CM

## 2020-08-29 LAB
ALBUMIN SERPL ELPH-MCNC: 4.1 G/DL — SIGNIFICANT CHANGE UP (ref 3.3–5.2)
ALP SERPL-CCNC: 88 U/L — SIGNIFICANT CHANGE UP (ref 40–120)
ALT FLD-CCNC: 14 U/L — SIGNIFICANT CHANGE UP
ANION GAP SERPL CALC-SCNC: 14 MMOL/L — SIGNIFICANT CHANGE UP (ref 5–17)
APPEARANCE UR: CLEAR — SIGNIFICANT CHANGE UP
APTT BLD: 26.8 SEC — LOW (ref 27.5–35.5)
AST SERPL-CCNC: 19 U/L — SIGNIFICANT CHANGE UP
BACTERIA # UR AUTO: ABNORMAL
BASOPHILS # BLD AUTO: 0.02 K/UL — SIGNIFICANT CHANGE UP (ref 0–0.2)
BASOPHILS NFR BLD AUTO: 0.2 % — SIGNIFICANT CHANGE UP (ref 0–2)
BILIRUB SERPL-MCNC: 0.3 MG/DL — LOW (ref 0.4–2)
BILIRUB UR-MCNC: ABNORMAL
BUN SERPL-MCNC: 14 MG/DL — SIGNIFICANT CHANGE UP (ref 8–20)
CALCIUM SERPL-MCNC: 9 MG/DL — SIGNIFICANT CHANGE UP (ref 8.6–10.2)
CHLORIDE SERPL-SCNC: 99 MMOL/L — SIGNIFICANT CHANGE UP (ref 98–107)
CO2 SERPL-SCNC: 24 MMOL/L — SIGNIFICANT CHANGE UP (ref 22–29)
COLOR SPEC: YELLOW — SIGNIFICANT CHANGE UP
CREAT SERPL-MCNC: 0.63 MG/DL — SIGNIFICANT CHANGE UP (ref 0.5–1.3)
DIFF PNL FLD: ABNORMAL
EOSINOPHIL # BLD AUTO: 0.07 K/UL — SIGNIFICANT CHANGE UP (ref 0–0.5)
EOSINOPHIL NFR BLD AUTO: 0.7 % — SIGNIFICANT CHANGE UP (ref 0–6)
EPI CELLS # UR: SIGNIFICANT CHANGE UP
GLUCOSE SERPL-MCNC: 107 MG/DL — HIGH (ref 70–99)
GLUCOSE UR QL: NEGATIVE — SIGNIFICANT CHANGE UP
HCT VFR BLD CALC: 41.4 % — SIGNIFICANT CHANGE UP (ref 34.5–45)
HGB BLD-MCNC: 13 G/DL — SIGNIFICANT CHANGE UP (ref 11.5–15.5)
IMM GRANULOCYTES NFR BLD AUTO: 0.7 % — SIGNIFICANT CHANGE UP (ref 0–1.5)
INR BLD: 1.26 RATIO — HIGH (ref 0.88–1.16)
KETONES UR-MCNC: NEGATIVE — SIGNIFICANT CHANGE UP
LACTATE BLDV-MCNC: 1.8 MMOL/L — SIGNIFICANT CHANGE UP (ref 0.5–2)
LEUKOCYTE ESTERASE UR-ACNC: ABNORMAL
LYMPHOCYTES # BLD AUTO: 1.37 K/UL — SIGNIFICANT CHANGE UP (ref 1–3.3)
LYMPHOCYTES # BLD AUTO: 14.4 % — SIGNIFICANT CHANGE UP (ref 13–44)
MCHC RBC-ENTMCNC: 26.4 PG — LOW (ref 27–34)
MCHC RBC-ENTMCNC: 31.4 GM/DL — LOW (ref 32–36)
MCV RBC AUTO: 84 FL — SIGNIFICANT CHANGE UP (ref 80–100)
MONOCYTES # BLD AUTO: 0.06 K/UL — SIGNIFICANT CHANGE UP (ref 0–0.9)
MONOCYTES NFR BLD AUTO: 0.6 % — LOW (ref 2–14)
NEUTROPHILS # BLD AUTO: 7.93 K/UL — HIGH (ref 1.8–7.4)
NEUTROPHILS NFR BLD AUTO: 83.4 % — HIGH (ref 43–77)
NITRITE UR-MCNC: POSITIVE
PH UR: 5 — SIGNIFICANT CHANGE UP (ref 5–8)
PLATELET # BLD AUTO: 249 K/UL — SIGNIFICANT CHANGE UP (ref 150–400)
POTASSIUM SERPL-MCNC: 3.9 MMOL/L — SIGNIFICANT CHANGE UP (ref 3.5–5.3)
POTASSIUM SERPL-SCNC: 3.9 MMOL/L — SIGNIFICANT CHANGE UP (ref 3.5–5.3)
PROT SERPL-MCNC: 7.5 G/DL — SIGNIFICANT CHANGE UP (ref 6.6–8.7)
PROT UR-MCNC: NEGATIVE — SIGNIFICANT CHANGE UP
PROTHROM AB SERPL-ACNC: 14.5 SEC — HIGH (ref 10.6–13.6)
RBC # BLD: 4.93 M/UL — SIGNIFICANT CHANGE UP (ref 3.8–5.2)
RBC # FLD: 14.6 % — HIGH (ref 10.3–14.5)
RBC CASTS # UR COMP ASSIST: SIGNIFICANT CHANGE UP /HPF (ref 0–4)
SODIUM SERPL-SCNC: 137 MMOL/L — SIGNIFICANT CHANGE UP (ref 135–145)
SP GR SPEC: 1.01 — SIGNIFICANT CHANGE UP (ref 1.01–1.02)
UROBILINOGEN FLD QL: 1
WBC # BLD: 9.52 K/UL — SIGNIFICANT CHANGE UP (ref 3.8–10.5)
WBC # FLD AUTO: 9.52 K/UL — SIGNIFICANT CHANGE UP (ref 3.8–10.5)
WBC UR QL: ABNORMAL

## 2020-08-29 PROCEDURE — 99291 CRITICAL CARE FIRST HOUR: CPT

## 2020-08-29 PROCEDURE — 99223 1ST HOSP IP/OBS HIGH 75: CPT

## 2020-08-29 PROCEDURE — 71045 X-RAY EXAM CHEST 1 VIEW: CPT | Mod: 26

## 2020-08-29 PROCEDURE — 93010 ELECTROCARDIOGRAM REPORT: CPT

## 2020-08-29 PROCEDURE — 74176 CT ABD & PELVIS W/O CONTRAST: CPT | Mod: 26

## 2020-08-29 RX ORDER — VANCOMYCIN HCL 1 G
1000 VIAL (EA) INTRAVENOUS ONCE
Refills: 0 | Status: COMPLETED | OUTPATIENT
Start: 2020-08-29 | End: 2020-08-30

## 2020-08-29 RX ORDER — SODIUM CHLORIDE 9 MG/ML
2500 INJECTION INTRAMUSCULAR; INTRAVENOUS; SUBCUTANEOUS ONCE
Refills: 0 | Status: COMPLETED | OUTPATIENT
Start: 2020-08-29 | End: 2020-08-29

## 2020-08-29 RX ORDER — ACETAMINOPHEN 500 MG
650 TABLET ORAL ONCE
Refills: 0 | Status: COMPLETED | OUTPATIENT
Start: 2020-08-29 | End: 2020-08-29

## 2020-08-29 RX ORDER — PIPERACILLIN AND TAZOBACTAM 4; .5 G/20ML; G/20ML
3.38 INJECTION, POWDER, LYOPHILIZED, FOR SOLUTION INTRAVENOUS ONCE
Refills: 0 | Status: COMPLETED | OUTPATIENT
Start: 2020-08-29 | End: 2020-08-29

## 2020-08-29 RX ADMIN — Medication 650 MILLIGRAM(S): at 22:24

## 2020-08-29 RX ADMIN — PIPERACILLIN AND TAZOBACTAM 200 GRAM(S): 4; .5 INJECTION, POWDER, LYOPHILIZED, FOR SOLUTION INTRAVENOUS at 22:09

## 2020-08-29 RX ADMIN — SODIUM CHLORIDE 2500 MILLILITER(S): 9 INJECTION INTRAMUSCULAR; INTRAVENOUS; SUBCUTANEOUS at 22:09

## 2020-08-29 NOTE — ED PROVIDER NOTE - ATTENDING CONTRIBUTION TO CARE
The patient seen and examined    Sepsis from UTI    I, Taj Mai, performed the initial face to face bedside interview with this patient regarding history of present illness, review of symptoms and relevant past medical, social and family history.  I completed an independent physical examination.  I was the initial provider who evaluated this patient. I have signed out the follow up of any pending tests (i.e. labs, radiological studies) to the resident.  I have communicated the patient’s plan of care and disposition with the resident.. The patient seen and examined    Sepsis from UTI    I, Taj Mai, performed the initial face to face bedside interview with this patient regarding history of present illness, review of symptoms and relevant past medical, social and family history.  I completed an independent physical examination.  I was the initial provider who evaluated this patient. I have signed out the follow up of any pending tests (i.e. labs, radiological studies) to the medical student.  I have communicated the patient’s plan of care and disposition with the medical student.

## 2020-08-29 NOTE — H&P ADULT - HISTORY OF PRESENT ILLNESS
60yoF hx RA on methotrexate presenting with dysuria and urinary frequency associated with left sided flank pain for about 3 days.  Pt also endorsing some subjective fevers and chills.  She has been on amoxicillin as prophylaxis after recent dental procedure which was prescribed about 5 days ago on 8/25. She reports persistent urinary symptoms despite abx use.  She denies any chest pain, dyspnea, cough, nausea, vomiting, hematuria or diarrhea.  UA with nitrites and pyuria.  CT A/P w/out showed moderate left hydronephrosis with transition point at the left ureteropelvic junction related to previous obstruction and no obstructing or intrarenal stones.  On admission, pt febrile with Tmax 101.3,  and in ED, pt received vanco, zosyn and 2.5L.

## 2020-08-29 NOTE — ED PROVIDER NOTE - CLINICAL SUMMARY MEDICAL DECISION MAKING FREE TEXT BOX
The patient presents with dysuria, urgency and fever and chill with L sided flank pain despite on amoxacillin and will admit for further evaluation

## 2020-08-29 NOTE — ED ADULT NURSE NOTE - CHIEF COMPLAINT QUOTE
Pt states, "I think I have a urinary tract infection". +Pelvic pain, increased urinary frequency, and lethargy. Pt states she had a fever at home 37.5 C and took two Advil gel tablets. Denies hematuria, foul odor, n, v, d. Hx of RA. Code sepsis activated. Pt brought to cc area.

## 2020-08-29 NOTE — H&P ADULT - NSHPPHYSICALEXAM_GEN_ALL_CORE
Vital Signs Last 24 Hrs  T(C): 37.5 (29 Aug 2020 23:08), Max: 38.5 (29 Aug 2020 21:27)  T(F): 99.5 (29 Aug 2020 23:08), Max: 101.3 (29 Aug 2020 21:27)  HR: 95 (29 Aug 2020 23:08) (95 - 106)  BP: 136/76 (29 Aug 2020 23:08) (128/80 - 136/76)  BP(mean): --  RR: 22 (29 Aug 2020 23:08) (18 - 22)  SpO2: 95% (29 Aug 2020 23:08) (94% - 95%)    GENERAL:  Well-appearing, not in acute distress  EYES:  Clear conjunctiva, extraocular movement intact  ENT: Moist mucous membranes  RESP:  Non-labored breathing pattern, lungs clear to ausculation in anterior fields  CV: Regular rate and rhythm, no murmurs appreciated, no lower extremity edema  GI: Soft, non-tender, non-distended  : No CVA tenderness   NEURO: Awake, alert, conversant, upper and lower extremity strength 5/5, light touch sensation grossly intact  PSYCH: Calm, cooperative  SKIN: No rash or lesions, warm and dry

## 2020-08-29 NOTE — H&P ADULT - ASSESSMENT
60yoF hx RA on methotrexate presenting with dysuria and urinary frequency associated with left sided flank pain for about 3 days despite being on amoxicillin as prophylactic antibiotics after recent dental procedure, on admission found to be febrile and tachycardic with positive UA    Pyelonephritis complicated by sepsis  -Clinical pyelonephritis suspected based on left sided flank pain, urinary symptoms with fever  -CT A/P limited to detect radiographic pyelonephritis without contrast, however no obstructing stones visualized   -In ED, received vanco and zosyn, will only continue with zosyn for now as pt immunocompromised given RA hx   -Lactate 1.8, received 2.5L in ED  -Urine and blood cx pending   -Tylenol PRN    Rheumatoid arthritis   -Methotrexate, folic acid and multivitamin resumed     Prophylactic measure  -Lovenox

## 2020-08-29 NOTE — ED ADULT NURSE NOTE - OBJECTIVE STATEMENT
PT presents to ED for dysuria symptoms.  pt with recent tooth extraction on PO abx. developed urinary symptoms. Started OTC Azos with no relief of symptoms. PT with back and suprapubic pain.  IV placed labs drawn and medicated per orders. CTM

## 2020-08-29 NOTE — ED PROVIDER NOTE - OBJECTIVE STATEMENT
Pt is a 61 yo woman with PMH of RA on methotrexate p/w 3 day history of worsening urinary symptoms with new onset fever and chills. Pt reports that she began to have burning with urination on Wed prior to presentation, pain has been worsening and she has now developed L-sided flank pain. Reports subjective fever at home with shaking and chills. Pain and fever have improved with Advil. Of note, patient received a tooth extraction Tues prior to presentation and was prescribed prophylactic amoxicillin after procedure which she is taking as prescribed. Denies hematuria, urine odor, history of kidney stones, shortness of breath, cough, abdominal pain, rash.

## 2020-08-29 NOTE — ED PROVIDER NOTE - CARE PLAN
Principal Discharge DX:	Sepsis  Secondary Diagnosis:	Pyelonephritis  Secondary Diagnosis:	Rheumatoid arthritis

## 2020-08-29 NOTE — ED ADULT TRIAGE NOTE - CHIEF COMPLAINT QUOTE
Pt states, "I think I have a urinary tract infection". +Pelvic pain, increased urinary frequency, and lethargy. Pt states she had a fever at home 37.5 C and took two Advil gel tablets. Denies hematuria, foul odor, n, v, d. Hx of RA. Pt states, "I think I have a urinary tract infection". +Pelvic pain, increased urinary frequency, and lethargy. Pt states she had a fever at home 37.5 C and took two Advil gel tablets. Denies hematuria, foul odor, n, v, d. Hx of RA. Code sepsis activated. Pt brought to cc area.

## 2020-08-29 NOTE — H&P ADULT - NSHPLABSRESULTS_GEN_ALL_CORE
13.0   9.52  )-----------( 249      ( 29 Aug 2020 21:59 )             41.4       08-29    137  |  99  |  14.0  ----------------------------<  107<H>  3.9   |  24.0  |  0.63    Ca    9.0      29 Aug 2020 21:59    TPro  7.5  /  Alb  4.1  /  TBili  0.3<L>  /  DBili  x   /  AST  19  /  ALT  14  /  AlkPhos  88  08-29

## 2020-08-30 LAB
SARS-COV-2 IGG SERPL QL IA: NEGATIVE — SIGNIFICANT CHANGE UP
SARS-COV-2 IGM SERPL IA-ACNC: 0.07 INDEX — SIGNIFICANT CHANGE UP
SARS-COV-2 RNA SPEC QL NAA+PROBE: SIGNIFICANT CHANGE UP

## 2020-08-30 PROCEDURE — 99233 SBSQ HOSP IP/OBS HIGH 50: CPT

## 2020-08-30 PROCEDURE — 99222 1ST HOSP IP/OBS MODERATE 55: CPT

## 2020-08-30 RX ORDER — FOLIC ACID 0.8 MG
1 TABLET ORAL DAILY
Refills: 0 | Status: DISCONTINUED | OUTPATIENT
Start: 2020-08-30 | End: 2020-09-01

## 2020-08-30 RX ORDER — ONDANSETRON 8 MG/1
4 TABLET, FILM COATED ORAL EVERY 6 HOURS
Refills: 0 | Status: DISCONTINUED | OUTPATIENT
Start: 2020-08-30 | End: 2020-09-01

## 2020-08-30 RX ORDER — ACETAMINOPHEN 500 MG
650 TABLET ORAL EVERY 6 HOURS
Refills: 0 | Status: DISCONTINUED | OUTPATIENT
Start: 2020-08-30 | End: 2020-09-01

## 2020-08-30 RX ORDER — SACCHAROMYCES BOULARDII 250 MG
250 POWDER IN PACKET (EA) ORAL
Refills: 0 | Status: DISCONTINUED | OUTPATIENT
Start: 2020-08-30 | End: 2020-09-01

## 2020-08-30 RX ORDER — PIPERACILLIN AND TAZOBACTAM 4; .5 G/20ML; G/20ML
3.38 INJECTION, POWDER, LYOPHILIZED, FOR SOLUTION INTRAVENOUS EVERY 8 HOURS
Refills: 0 | Status: DISCONTINUED | OUTPATIENT
Start: 2020-08-30 | End: 2020-09-01

## 2020-08-30 RX ORDER — ENOXAPARIN SODIUM 100 MG/ML
40 INJECTION SUBCUTANEOUS DAILY
Refills: 0 | Status: DISCONTINUED | OUTPATIENT
Start: 2020-08-30 | End: 2020-09-01

## 2020-08-30 RX ADMIN — Medication 250 MILLIGRAM(S): at 01:30

## 2020-08-30 RX ADMIN — Medication 650 MILLIGRAM(S): at 15:46

## 2020-08-30 RX ADMIN — Medication 1 TABLET(S): at 11:55

## 2020-08-30 RX ADMIN — ENOXAPARIN SODIUM 40 MILLIGRAM(S): 100 INJECTION SUBCUTANEOUS at 11:55

## 2020-08-30 RX ADMIN — PIPERACILLIN AND TAZOBACTAM 25 GRAM(S): 4; .5 INJECTION, POWDER, LYOPHILIZED, FOR SOLUTION INTRAVENOUS at 05:24

## 2020-08-30 RX ADMIN — PIPERACILLIN AND TAZOBACTAM 25 GRAM(S): 4; .5 INJECTION, POWDER, LYOPHILIZED, FOR SOLUTION INTRAVENOUS at 21:24

## 2020-08-30 RX ADMIN — Medication 1 MILLIGRAM(S): at 11:55

## 2020-08-30 RX ADMIN — Medication 650 MILLIGRAM(S): at 22:56

## 2020-08-30 RX ADMIN — Medication 650 MILLIGRAM(S): at 23:56

## 2020-08-30 RX ADMIN — Medication 650 MILLIGRAM(S): at 16:15

## 2020-08-30 RX ADMIN — Medication 250 MILLIGRAM(S): at 17:22

## 2020-08-30 RX ADMIN — PIPERACILLIN AND TAZOBACTAM 25 GRAM(S): 4; .5 INJECTION, POWDER, LYOPHILIZED, FOR SOLUTION INTRAVENOUS at 13:15

## 2020-08-30 NOTE — PROGRESS NOTE ADULT - ASSESSMENT
60 years old female with PMH of Rheumatoid Arthritis on Methotrexate presented with dysuria and urinary frequency associated with left sided flank pain for about 3 days despite being on amoxicillin as prophylactic antibiotics after recent dental procedure -- found to be febrile and tachycardic with positive UA    1) Sepsis secondary to Left Pyelonephritis  - Follow cultures  - Continue Zosyn    2) Left Hydronephrosis  - Likely due to UPJ obstruction   - She is scheduled to see Urology on 9/9 in [place]   - Given her current symptoms and infection, will consult Urology      3) Rheumatoid Arthritis   - Hold Methotrexate due to active infection    DVT Prophylaxis -- Lovenox SQ    Dispo: Home in 48 hours

## 2020-08-30 NOTE — CONSULT NOTE ADULT - ASSESSMENT
60F with Hx as above with Pyelonephritis and L hydronephrosis    - continue ada spectrum abx, f/u culture to narrow choice 60F with Hx as above with Pyelonephritis and L hydronephrosis    - continue broad spectrum abx, f/u culture to narrow choice

## 2020-08-30 NOTE — ED ADULT NURSE REASSESSMENT NOTE - NS ED NURSE REASSESS COMMENT FT1
Pt. transported to [facility], report given to [facility] Cintron. Pt. safety and comfort measures maintained. Handoff and transfer of care occurred @ [id number].

## 2020-08-30 NOTE — CONSULT NOTE ADULT - SUBJECTIVE AND OBJECTIVE BOX
HPI:  60F PMHx RA on methotrexate who presents with L flank pain associated with dysuria, increased urinary frequency and subjective fevers/chills for 4-5 days.  Has never experienced anything like this before, denies and urologic hx of stones, no hematuria, bladder issues, no foul smelling urine.  Tried to get seen by her doctor for this but couldn't get an appointment until  but her symptoms became worse so she came to the ED.  Of note, She had recent dental work and was put on amoxicillin as prophylaxis after recent dental procedure which was prescribed about 5 days ago on .  She denies any chest pain, dyspnea, cough, nausea, vomiting, hematuria or diarrhea.  On presentation to the ED she On admission, pt febrile with Tmax 101.3,  and in ED, pt received vanco, zosyn and 2.5L.  Workup in the ED showed UA postive for nitrites and pyuria and  CT A/P w/out showed moderate left hydronephrosis with transition point at the left ureteropelvic junction related to previous obstruction with no obstructing or intrarenal stones.     ROS: negative except for HPI    PMHx: Rhuematoid Arthritis  PSHx: Gallbladder  FamHx: Mother RA, otherwise non-contributory  Allergies: Denies        Home Meds:  Methotrexate, MVI, Folic Acid, Amoxicillin for 5 days          MEDICATIONS  (STANDING):  enoxaparin Injectable 40 milliGRAM(s) SubCutaneous daily  folic acid 1 milliGRAM(s) Oral daily  multivitamin 1 Tablet(s) Oral daily  piperacillin/tazobactam IVPB.. 3.375 Gram(s) IV Intermittent every 8 hours  saccharomyces boulardii 250 milliGRAM(s) Oral two times a day    MEDICATIONS  (PRN):  acetaminophen   Tablet .. 650 milliGRAM(s) Oral every 6 hours PRN Temp greater or equal to 38C (100.4F), Mild Pain (1 - 3), Moderate Pain (4 - 6)  ondansetron Injectable 4 milliGRAM(s) IV Push every 6 hours PRN Nausea and/or Vomiting      Vital Signs Last 24 Hrs  T(C): 37.5 (30 Aug 2020 15:45), Max: 38.5 (29 Aug 2020 21:27)  T(F): 99.5 (30 Aug 2020 15:45), Max: 101.3 (29 Aug 2020 21:27)  HR: 78 (30 Aug 2020 15:45) (59 - 106)  BP: 103/54 (30 Aug 2020 15:45) (94/60 - 136/76)  BP(mean): --  RR: 20 (30 Aug 2020 15:45) (18 - 22)  SpO2: 92% (30 Aug 2020 15:45) (92% - 97%)    PHYSICAL EXAM:      Constitutional: NAD    Respiratory: no accessory muscle use    Cardiovascular: S1S2    Gastrointestinal: Soft, NT/ND no suprapubic distention or tenderness, no CVAT              I&O's Detail      LABS:                        13.0   9.52  )-----------( 249      ( 29 Aug 2020 21:59 )             41.4     08-29    137  |  99  |  14.0  ----------------------------<  107<H>  3.9   |  24.0  |  0.63    Ca    9.0      29 Aug 2020 21:59    TPro  7.5  /  Alb  4.1  /  TBili  0.3<L>  /  DBili  x   /  AST  19  /  ALT  14  /  AlkPhos  88  08-29    PT/INR - ( 29 Aug 2020 21:59 )   PT: 14.5 sec;   INR: 1.26 ratio         PTT - ( 29 Aug 2020 21:59 )  PTT:26.8 sec  Urinalysis Basic - ( 29 Aug 2020 22:26 )    Color: Yellow / Appearance: Clear / S.015 / pH: x  Gluc: x / Ketone: Negative  / Bili: Small / Urobili: 1   Blood: x / Protein: Negative / Nitrite: Positive   Leuk Esterase: Small / RBC: 0-2 /HPF / WBC 6-10   Sq Epi: x / Non Sq Epi: Occasional / Bacteria: Occasional        RADIOLOGY & ADDITIONAL STUDIES:

## 2020-08-30 NOTE — PROGRESS NOTE ADULT - SUBJECTIVE AND OBJECTIVE BOX
Sepsis    HPI:  60yoF hx RA on methotrexate presenting with dysuria and urinary frequency associated with left sided flank pain for about 3 days.  Pt also endorsing some subjective fevers and chills.  She has been on amoxicillin as prophylaxis after recent dental procedure which was prescribed about 5 days ago on . She reports persistent urinary symptoms despite abx use.  She denies any chest pain, dyspnea, cough, nausea, vomiting, hematuria or diarrhea.  UA with nitrites and pyuria.  CT A/P w/out showed moderate left hydronephrosis with transition point at the left ureteropelvic junction related to previous obstruction and no obstructing or intrarenal stones.  On admission, pt febrile with Tmax 101.3,  and in ED, pt received vanco, zosyn and 2.5L. (29 Aug 2020 23:44)    Interval History:  Patient was seen and examined at bedside. Feeling better.  Urinary symptoms are improving.  Denies nausea, vomiting or flank pain.   Denies chest pain, palpitations, shortness of breath, headache or dizziness.     ROS:  As per interval history otherwise unremarkable.    PHYSICAL EXAM:  Vital Signs   T(C): 36.7 (30 Aug 2020 11:17), Max: 38.5 (29 Aug 2020 21:27)  T(F): 98 (30 Aug 2020 11:17), Max: 101.3 (29 Aug 2020 21:27)  HR: 61 (30 Aug 2020 11:17) (59 - 106)  BP: 109/67 (30 Aug 2020 11:17) (94/60 - 136/76)  RR: 20 (30 Aug 2020 11:17) (18 - 22)  SpO2: 97% (30 Aug 2020 11:17) (94% - 97%)  General: Well developed. Well nourished. No acute distress  HEENT: PERRLA. EOMI. Clear conjunctivae. Moist mucus membrane  Neck: Supple.   Chest: CTA bilaterally. No wheezing, rales or rhonchi.   Heart: Normal S1 & S2. RRR.   Abdomen: Obese. Soft. Non-tender. + BS  Ext: No pedal edema. No calf tenderness   Neuro: AAO x 3. No focal deficit. No speech disorder  Skin: Warm and Dry  Psychiatry: Normal mood and affect    MEDICATIONS  (STANDING):  enoxaparin Injectable 40 milliGRAM(s) SubCutaneous daily  folic acid 1 milliGRAM(s) Oral daily  multivitamin 1 Tablet(s) Oral daily  piperacillin/tazobactam IVPB.. 3.375 Gram(s) IV Intermittent every 8 hours  saccharomyces boulardii 250 milliGRAM(s) Oral two times a day    MEDICATIONS  (PRN):  acetaminophen   Tablet .. 650 milliGRAM(s) Oral every 6 hours PRN Temp greater or equal to 38C (100.4F), Mild Pain (1 - 3), Moderate Pain (4 - 6)    LABS:                        13.0   9.52  )-----------( 249      ( 29 Aug 2020 21:59 )             41.4         137  |  99  |  14.0  ----------------------------<  107<H>  3.9   |  24.0  |  0.63    Ca    9.0      29 Aug 2020 21:59    TPro  7.5  /  Alb  4.1  /  TBili  0.3<L>  /  DBili  x   /  AST  19  /  ALT  14  /  AlkPhos  88      PT/INR - ( 29 Aug 2020 21:59 )   PT: 14.5 sec;   INR: 1.26 ratio       PTT - ( 29 Aug 2020 21:59 )  PTT:26.8 sec  Urinalysis Basic - ( 29 Aug 2020 22:26 )    Color: Yellow / Appearance: Clear / S.015 / pH: x  Gluc: x / Ketone: Negative  / Bili: Small / Urobili: 1   Blood: x / Protein: Negative / Nitrite: Positive   Leuk Esterase: Small / RBC: 0-2 /HPF / WBC 6-10   Sq Epi: x / Non Sq Epi: Occasional / Bacteria: Occasional    RADIOLOGY & ADDITIONAL STUDIES:  Reviewed

## 2020-08-30 NOTE — CONSULT NOTE ADULT - ATTENDING COMMENTS
Impression:    UTI  intermittent flank pain, minimal  left pelvocaliectasis, no hydroureter    Plan;    Antibiotics  no intevention at this time  Lasix renogram to assess for UPJ obstruction  await cultures.

## 2020-08-31 LAB
-  AMIKACIN: SIGNIFICANT CHANGE UP
-  AMOXICILLIN/CLAVULANIC ACID: SIGNIFICANT CHANGE UP
-  AMPICILLIN/SULBACTAM: SIGNIFICANT CHANGE UP
-  AMPICILLIN: SIGNIFICANT CHANGE UP
-  AZTREONAM: SIGNIFICANT CHANGE UP
-  CEFAZOLIN: SIGNIFICANT CHANGE UP
-  CEFEPIME: SIGNIFICANT CHANGE UP
-  CEFOXITIN: SIGNIFICANT CHANGE UP
-  CEFTRIAXONE: SIGNIFICANT CHANGE UP
-  CIPROFLOXACIN: SIGNIFICANT CHANGE UP
-  ERTAPENEM: SIGNIFICANT CHANGE UP
-  GENTAMICIN: SIGNIFICANT CHANGE UP
-  LEVOFLOXACIN: SIGNIFICANT CHANGE UP
-  MEROPENEM: SIGNIFICANT CHANGE UP
-  NITROFURANTOIN: SIGNIFICANT CHANGE UP
-  PIPERACILLIN/TAZOBACTAM: SIGNIFICANT CHANGE UP
-  TIGECYCLINE: SIGNIFICANT CHANGE UP
-  TOBRAMYCIN: SIGNIFICANT CHANGE UP
-  TRIMETHOPRIM/SULFAMETHOXAZOLE: SIGNIFICANT CHANGE UP
ANION GAP SERPL CALC-SCNC: 11 MMOL/L — SIGNIFICANT CHANGE UP (ref 5–17)
BASOPHILS # BLD AUTO: 0.02 K/UL — SIGNIFICANT CHANGE UP (ref 0–0.2)
BASOPHILS NFR BLD AUTO: 0.3 % — SIGNIFICANT CHANGE UP (ref 0–2)
BUN SERPL-MCNC: 9 MG/DL — SIGNIFICANT CHANGE UP (ref 8–20)
CALCIUM SERPL-MCNC: 8.5 MG/DL — LOW (ref 8.6–10.2)
CHLORIDE SERPL-SCNC: 103 MMOL/L — SIGNIFICANT CHANGE UP (ref 98–107)
CO2 SERPL-SCNC: 25 MMOL/L — SIGNIFICANT CHANGE UP (ref 22–29)
CREAT SERPL-MCNC: 0.48 MG/DL — LOW (ref 0.5–1.3)
CULTURE RESULTS: SIGNIFICANT CHANGE UP
EOSINOPHIL # BLD AUTO: 0.1 K/UL — SIGNIFICANT CHANGE UP (ref 0–0.5)
EOSINOPHIL NFR BLD AUTO: 1.7 % — SIGNIFICANT CHANGE UP (ref 0–6)
GLUCOSE SERPL-MCNC: 102 MG/DL — HIGH (ref 70–99)
HCT VFR BLD CALC: 37.6 % — SIGNIFICANT CHANGE UP (ref 34.5–45)
HCV AB S/CO SERPL IA: 0.15 S/CO — SIGNIFICANT CHANGE UP (ref 0–0.99)
HCV AB SERPL-IMP: SIGNIFICANT CHANGE UP
HGB BLD-MCNC: 11.7 G/DL — SIGNIFICANT CHANGE UP (ref 11.5–15.5)
IMM GRANULOCYTES NFR BLD AUTO: 0.2 % — SIGNIFICANT CHANGE UP (ref 0–1.5)
LYMPHOCYTES # BLD AUTO: 1.43 K/UL — SIGNIFICANT CHANGE UP (ref 1–3.3)
LYMPHOCYTES # BLD AUTO: 24.9 % — SIGNIFICANT CHANGE UP (ref 13–44)
MAGNESIUM SERPL-MCNC: 2.1 MG/DL — SIGNIFICANT CHANGE UP (ref 1.6–2.6)
MCHC RBC-ENTMCNC: 26.5 PG — LOW (ref 27–34)
MCHC RBC-ENTMCNC: 31.1 GM/DL — LOW (ref 32–36)
MCV RBC AUTO: 85.3 FL — SIGNIFICANT CHANGE UP (ref 80–100)
METHOD TYPE: SIGNIFICANT CHANGE UP
MONOCYTES # BLD AUTO: 0.72 K/UL — SIGNIFICANT CHANGE UP (ref 0–0.9)
MONOCYTES NFR BLD AUTO: 12.5 % — SIGNIFICANT CHANGE UP (ref 2–14)
NEUTROPHILS # BLD AUTO: 3.47 K/UL — SIGNIFICANT CHANGE UP (ref 1.8–7.4)
NEUTROPHILS NFR BLD AUTO: 60.4 % — SIGNIFICANT CHANGE UP (ref 43–77)
ORGANISM # SPEC MICROSCOPIC CNT: SIGNIFICANT CHANGE UP
ORGANISM # SPEC MICROSCOPIC CNT: SIGNIFICANT CHANGE UP
PLATELET # BLD AUTO: 201 K/UL — SIGNIFICANT CHANGE UP (ref 150–400)
POTASSIUM SERPL-MCNC: 3.4 MMOL/L — LOW (ref 3.5–5.3)
POTASSIUM SERPL-SCNC: 3.4 MMOL/L — LOW (ref 3.5–5.3)
RBC # BLD: 4.41 M/UL — SIGNIFICANT CHANGE UP (ref 3.8–5.2)
RBC # FLD: 14.8 % — HIGH (ref 10.3–14.5)
SODIUM SERPL-SCNC: 139 MMOL/L — SIGNIFICANT CHANGE UP (ref 135–145)
SPECIMEN SOURCE: SIGNIFICANT CHANGE UP
WBC # BLD: 5.75 K/UL — SIGNIFICANT CHANGE UP (ref 3.8–10.5)
WBC # FLD AUTO: 5.75 K/UL — SIGNIFICANT CHANGE UP (ref 3.8–10.5)

## 2020-08-31 PROCEDURE — 99233 SBSQ HOSP IP/OBS HIGH 50: CPT | Mod: GC

## 2020-08-31 PROCEDURE — 36569 INSJ PICC 5 YR+ W/O IMAGING: CPT

## 2020-08-31 PROCEDURE — 78708 K FLOW/FUNCT IMAGE W/DRUG: CPT | Mod: 26

## 2020-08-31 RX ORDER — POTASSIUM CHLORIDE 20 MEQ
40 PACKET (EA) ORAL ONCE
Refills: 0 | Status: COMPLETED | OUTPATIENT
Start: 2020-08-31 | End: 2020-08-31

## 2020-08-31 RX ORDER — SODIUM CHLORIDE 9 MG/ML
500 INJECTION INTRAMUSCULAR; INTRAVENOUS; SUBCUTANEOUS
Refills: 0 | Status: COMPLETED | OUTPATIENT
Start: 2020-08-31 | End: 2020-08-31

## 2020-08-31 RX ORDER — FUROSEMIDE 40 MG
40 TABLET ORAL ONCE
Refills: 0 | Status: DISCONTINUED | OUTPATIENT
Start: 2020-08-31 | End: 2020-08-31

## 2020-08-31 RX ORDER — SODIUM CHLORIDE 9 MG/ML
820 INJECTION INTRAMUSCULAR; INTRAVENOUS; SUBCUTANEOUS ONCE
Refills: 0 | Status: COMPLETED | OUTPATIENT
Start: 2020-08-31 | End: 2020-08-31

## 2020-08-31 RX ADMIN — SODIUM CHLORIDE 820 MILLILITER(S): 9 INJECTION INTRAMUSCULAR; INTRAVENOUS; SUBCUTANEOUS at 09:29

## 2020-08-31 RX ADMIN — Medication 1 TABLET(S): at 12:39

## 2020-08-31 RX ADMIN — Medication 250 MILLIGRAM(S): at 21:14

## 2020-08-31 RX ADMIN — ONDANSETRON 4 MILLIGRAM(S): 8 TABLET, FILM COATED ORAL at 13:20

## 2020-08-31 RX ADMIN — ENOXAPARIN SODIUM 40 MILLIGRAM(S): 100 INJECTION SUBCUTANEOUS at 12:39

## 2020-08-31 RX ADMIN — SODIUM CHLORIDE 150 MILLILITER(S): 9 INJECTION INTRAMUSCULAR; INTRAVENOUS; SUBCUTANEOUS at 16:45

## 2020-08-31 RX ADMIN — Medication 1 MILLIGRAM(S): at 12:39

## 2020-08-31 RX ADMIN — Medication 40 MILLIEQUIVALENT(S): at 12:39

## 2020-08-31 RX ADMIN — PIPERACILLIN AND TAZOBACTAM 25 GRAM(S): 4; .5 INJECTION, POWDER, LYOPHILIZED, FOR SOLUTION INTRAVENOUS at 05:52

## 2020-08-31 RX ADMIN — PIPERACILLIN AND TAZOBACTAM 25 GRAM(S): 4; .5 INJECTION, POWDER, LYOPHILIZED, FOR SOLUTION INTRAVENOUS at 21:14

## 2020-08-31 RX ADMIN — PIPERACILLIN AND TAZOBACTAM 25 GRAM(S): 4; .5 INJECTION, POWDER, LYOPHILIZED, FOR SOLUTION INTRAVENOUS at 14:41

## 2020-08-31 RX ADMIN — Medication 250 MILLIGRAM(S): at 05:52

## 2020-08-31 NOTE — PROGRESS NOTE ADULT - SUBJECTIVE AND OBJECTIVE BOX
HPI:  60yoF hx RA on methotrexate presenting with dysuria and urinary frequency associated with left sided flank pain for about 3 days.  Pt also endorsing some subjective fevers and chills.  She has been on amoxicillin as prophylaxis after recent dental procedure which was prescribed about 5 days ago on . She reports persistent urinary symptoms despite abx use.  She denies any chest pain, dyspnea, cough, nausea, vomiting, hematuria or diarrhea.  UA with nitrites and pyuria.  CT A/P w/out showed moderate left hydronephrosis with transition point at the left ureteropelvic junction related to previous obstruction and no obstructing or intrarenal stones.  On admission, pt febrile with Tmax 101.3,  and in ED, pt received vanco, zosyn and 2.5L. (29 Aug 2020 23:44)    20  Pt is examinated at bedside and found NAD and in no acute distress. She reporst left flank pain has improved since yesterday. She had an episode of [id number] yesterday at 8: 55 pm that resolved and have remained afebrile since that. UCx  is growing E coli. Pt is schedule to have a lasix renogram today by urology recommendations. She denies chills ,nausea, vomits, abdominal pain, dysuria, hematuria and any other symptoms. No other events overnight.     MEDICATIONS  (STANDING):  enoxaparin Injectable 40 milliGRAM(s) SubCutaneous daily  folic acid 1 milliGRAM(s) Oral daily  multivitamin 1 Tablet(s) Oral daily  piperacillin/tazobactam IVPB.. 3.375 Gram(s) IV Intermittent every 8 hours  saccharomyces boulardii 250 milliGRAM(s) Oral two times a day    MEDICATIONS  (PRN):  acetaminophen   Tablet .. 650 milliGRAM(s) Oral every 6 hours PRN Temp greater or equal to 38C (100.4F), Mild Pain (1 - 3), Moderate Pain (4 - 6)  ondansetron Injectable 4 milliGRAM(s) IV Push every 6 hours PRN Nausea and/or Vomiting      Vital Signs Last 24 Hrs  T(C): 36.8 (31 Aug 2020 07:50), Max: 38.4 (30 Aug 2020 22:55)  T(F): 98.3 (31 Aug 2020 07:50), Max: 101.2 (30 Aug 2020 22:55)  HR: 65 (31 Aug 2020 07:50) (59 - 85)  BP: 126/75 (31 Aug 2020 07:50) (103/54 - 132/75)  BP(mean): --  RR: 20 (31 Aug 2020 07:50) (20 - 20)  SpO2: 98% (31 Aug 2020 07:50) (92% - 98%)    GEN: NAD, comfortable, resting in bed  HEENT: NC/AT, EOMI, PERRLA, MMM, clear conjunctiva and sclera, normal hearing, no nasal discharge, throat clear, no thrush, normal dentition.   BACK:  ROM intact, no spinal/paraspinal tenderness. No CVA tenderness.   CV: S1S2, RRR, no murmur  RESP: good air movement, CTABL, no rales, rhonchi or wheezing, respirations unlabored  ABD: +BS, soft, ND, NT, no guarding, no rigidity, no HSM  EXT: +2 radial and pedal pulses, no edema, no calve tenderness  MSK: ROM intact in all extremities  NEURO:  sensory and CN 2-12 Grossly intact, no motor deficits,  AOx3          LABS:                          11.7   5.75  )-----------( 201      ( 31 Aug 2020 08:11 )             37.6     29 Aug 2020 21:59    137    |  99     |  14.0   ----------------------------<  107    3.9     |  24.0   |  0.63     Ca    9.0        29 Aug 2020 21:59    TPro  7.5    /  Alb  4.1    /  TBili  0.3    /  DBili  x      /  AST  19     /  ALT  14     /  AlkPhos  88     29 Aug 2020 21:59    LIVER FUNCTIONS - ( 29 Aug 2020 21:59 )  Alb: 4.1 g/dL / Pro: 7.5 g/dL / ALK PHOS: 88 U/L / ALT: 14 U/L / AST: 19 U/L / GGT: x           PT/INR - ( 29 Aug 2020 21:59 )   PT: 14.5 sec;   INR: 1.26 ratio         PTT - ( 29 Aug 2020 21:59 )  PTT:26.8 sec  CAPILLARY BLOOD GLUCOSE            Urinalysis Basic - ( 29 Aug 2020 22:26 )    Color: Yellow / Appearance: Clear / S.015 / pH: x  Gluc: x / Ketone: Negative  / Bili: Small / Urobili: 1   Blood: x / Protein: Negative / Nitrite: Positive   Leuk Esterase: Small / RBC: 0-2 /HPF / WBC 6-10   Sq Epi: x / Non Sq Epi: Occasional / Bacteria: Occasional        RADIOLOGY:    CT Renal Stone Hunt (20 @ 23:45) >    FINDINGS:    Lung bases:  There are no pleural effusions. Dependent atelectatic changes at the lung bases  Peritoneum:  There is no free air.  No free fluid.    Evaluation of solid abdominal organ is diminished without IV contrast.    Liver: Unremarkable.  Spleen: Unremarkable.  Gallbladder: Cholecystectomy  Biliary tree: Unremarkable.  Pancreas: Unremarkable.  Adrenal glands: Unremarkable.    Kidneys: There appears to be moderate left-sided hydronephrosis with transition at the left ureteropelvic junction. There is no hydroureter ureter or obstructing stone. Please note that without IV contrast, pyelonephritis which is a clinical diagnosis, cannot be assessed.    Urinary bladder: Incompletely distended.    Pelvic organs: No pelvic masses.    Bowel:  There is no small bowel obstruction.  The appendix is unremarkable. The colon is underdistended without significant fecal load.    Vasculature: Unremarkable.  There is no significant adenopathy.  Bones: Chronic degenerative changes of the spine..  Subcutaneous tissues: Small fat-containing umbilical hernia.    IMPRESSION:    There appears to be moderate left hydronephrosis with transition point at the left ureteropelvic junction related to previous obstruction. No hydroureter or obstructing stone. No intrarenal stones.  Please note that without IV contrast, pyelonephritis, which is a clinical diagnosis, cannot be assessed.  Cholecystectomy.  Normal appendix.

## 2020-08-31 NOTE — PROGRESS NOTE ADULT - ASSESSMENT
60 years old female with PMH of Rheumatoid Arthritis on Methotrexate came to ED on 8/29/20 for evaluation of dysuria and urinary frequency associated with left sided flank pain that started 3 days previous to this visit. Pt had a recent dental procedure and was taking amoxicillin PO. At arrival to ED patient found febrile 101.3 and with tachycardia 106. UA remarkable for an UTI. CT abdomen an pelvis performed and showed moderate left hydronephrosis  with transition point at  the left uteropelvic junction concerning for possible pyelonephritis Urology consulted and recommended a Lasix renogram to r/o  UPJ obstruction.    #Sepsis secondary to left side pyelonephritis  - 2/4 SIRS criteria at admission: Tachycardia 106, Fever 101.2  - Lactate at admission 1.8, s/p 2.5 L IV fluids   - UA showed + Nitrite, small LE   - UCx growing E coli, F/u final sensitivity   - BCx pending   - Had an episode of fever 101.02 at 8:55 pm yesterday  - Continue on Zosyn 3.375mg IV every 8 hrs   - Urology recommendations appreciated   - Lasix renogram ordered to rule out UPJ obstruction   - Continue monitoring vital signs 	    #Left side hydronephrosis  -CT abdomen an pelvis performed and showed moderate left hydronephrosis  with transition point at  the left uteropelvic junction concerning   - Could be secondary to possible UPJ obstruction   - Urology recommendations appreciated   - Lasix renogram ordered to rule out UPJ obstruction     # Hypokalemia   - K level 3.4   - KCL 40 mEq PO one dose given   - Monitor K levels     #Rheumatoid Arthritis   - On Methotrexate 12.5 mg PO on Monday   - Put on hold due to current infection     #DVT ppx:   - Continue on Lovenox SQ     Dispo: Pending BCx and Lasix renogram. Possible discharge in 24-48hrs     Case discussed with Dr Roldan

## 2020-08-31 NOTE — PROGRESS NOTE ADULT - ATTENDING COMMENTS
Patient was seen and examined at bedside. Had fever 101.2 last night.  Feeling weak. Denies flank pain or urinary symptoms.  Abdomen obese, soft, non tender and non distended. No CVA tenderness.   Urine culture with E. Coli. ID & Sensitivity pending.   Lasix renal scan with obstructed left kidney with good flow and function.  No intervention as per Urology.  Continue current treatment.  Follow cultures.  Likely discharge in 24 hours.

## 2020-08-31 NOTE — CHART NOTE - NSCHARTNOTEFT_GEN_A_CORE
Pt resting comfortably  Febrile last night 101.2, now 97.4  Awaiting renal scan  will follow up Pt resting comfortably  Febrile last night 101.2, now 97.4  Awaiting renal scan  will follow up      Lasix renogram reveals good function to both kidneys. No evidence of obstruction.     no  intervention needed.  Antibiotics recommended. No  follow up needed.

## 2020-09-01 ENCOUNTER — TRANSCRIPTION ENCOUNTER (OUTPATIENT)
Age: 60
End: 2020-09-01

## 2020-09-01 VITALS
HEART RATE: 75 BPM | TEMPERATURE: 98 F | DIASTOLIC BLOOD PRESSURE: 73 MMHG | OXYGEN SATURATION: 98 % | SYSTOLIC BLOOD PRESSURE: 132 MMHG | RESPIRATION RATE: 18 BRPM

## 2020-09-01 LAB
ANION GAP SERPL CALC-SCNC: 11 MMOL/L — SIGNIFICANT CHANGE UP (ref 5–17)
BUN SERPL-MCNC: 10 MG/DL — SIGNIFICANT CHANGE UP (ref 8–20)
CALCIUM SERPL-MCNC: 8.6 MG/DL — SIGNIFICANT CHANGE UP (ref 8.6–10.2)
CHLORIDE SERPL-SCNC: 101 MMOL/L — SIGNIFICANT CHANGE UP (ref 98–107)
CO2 SERPL-SCNC: 26 MMOL/L — SIGNIFICANT CHANGE UP (ref 22–29)
CREAT SERPL-MCNC: 0.53 MG/DL — SIGNIFICANT CHANGE UP (ref 0.5–1.3)
GLUCOSE SERPL-MCNC: 114 MG/DL — HIGH (ref 70–99)
MAGNESIUM SERPL-MCNC: 2.1 MG/DL — SIGNIFICANT CHANGE UP (ref 1.6–2.6)
PHOSPHATE SERPL-MCNC: 2.8 MG/DL — SIGNIFICANT CHANGE UP (ref 2.4–4.7)
POTASSIUM SERPL-MCNC: 3.6 MMOL/L — SIGNIFICANT CHANGE UP (ref 3.5–5.3)
POTASSIUM SERPL-SCNC: 3.6 MMOL/L — SIGNIFICANT CHANGE UP (ref 3.5–5.3)
SODIUM SERPL-SCNC: 138 MMOL/L — SIGNIFICANT CHANGE UP (ref 135–145)

## 2020-09-01 PROCEDURE — 96374 THER/PROPH/DIAG INJ IV PUSH: CPT

## 2020-09-01 PROCEDURE — A9562: CPT

## 2020-09-01 PROCEDURE — 87086 URINE CULTURE/COLONY COUNT: CPT

## 2020-09-01 PROCEDURE — 83605 ASSAY OF LACTIC ACID: CPT

## 2020-09-01 PROCEDURE — 83735 ASSAY OF MAGNESIUM: CPT

## 2020-09-01 PROCEDURE — U0003: CPT

## 2020-09-01 PROCEDURE — 85730 THROMBOPLASTIN TIME PARTIAL: CPT

## 2020-09-01 PROCEDURE — 99285 EMERGENCY DEPT VISIT HI MDM: CPT | Mod: 25

## 2020-09-01 PROCEDURE — 71045 X-RAY EXAM CHEST 1 VIEW: CPT

## 2020-09-01 PROCEDURE — 85610 PROTHROMBIN TIME: CPT

## 2020-09-01 PROCEDURE — 36415 COLL VENOUS BLD VENIPUNCTURE: CPT

## 2020-09-01 PROCEDURE — 86803 HEPATITIS C AB TEST: CPT

## 2020-09-01 PROCEDURE — 78708 K FLOW/FUNCT IMAGE W/DRUG: CPT

## 2020-09-01 PROCEDURE — 81001 URINALYSIS AUTO W/SCOPE: CPT

## 2020-09-01 PROCEDURE — 93005 ELECTROCARDIOGRAM TRACING: CPT

## 2020-09-01 PROCEDURE — 99239 HOSP IP/OBS DSCHRG MGMT >30: CPT

## 2020-09-01 PROCEDURE — 80053 COMPREHEN METABOLIC PANEL: CPT

## 2020-09-01 PROCEDURE — 74176 CT ABD & PELVIS W/O CONTRAST: CPT

## 2020-09-01 PROCEDURE — 87040 BLOOD CULTURE FOR BACTERIA: CPT

## 2020-09-01 PROCEDURE — 85027 COMPLETE CBC AUTOMATED: CPT

## 2020-09-01 PROCEDURE — 84100 ASSAY OF PHOSPHORUS: CPT

## 2020-09-01 PROCEDURE — 80048 BASIC METABOLIC PNL TOTAL CA: CPT

## 2020-09-01 PROCEDURE — 87186 SC STD MICRODIL/AGAR DIL: CPT

## 2020-09-01 PROCEDURE — 86769 SARS-COV-2 COVID-19 ANTIBODY: CPT

## 2020-09-01 RX ORDER — CIPROFLOXACIN LACTATE 400MG/40ML
1 VIAL (ML) INTRAVENOUS
Qty: 7 | Refills: 0
Start: 2020-09-01 | End: 2020-09-07

## 2020-09-01 RX ORDER — ACETAMINOPHEN 500 MG
2 TABLET ORAL
Qty: 0 | Refills: 0 | DISCHARGE
Start: 2020-09-01

## 2020-09-01 RX ORDER — SACCHAROMYCES BOULARDII 250 MG
1 POWDER IN PACKET (EA) ORAL
Qty: 14 | Refills: 0
Start: 2020-09-01 | End: 2020-09-07

## 2020-09-01 RX ORDER — METHOTREXATE 2.5 MG/1
5 TABLET ORAL
Qty: 0 | Refills: 0 | DISCHARGE

## 2020-09-01 RX ORDER — FOLIC ACID 0.8 MG
1 TABLET ORAL
Qty: 0 | Refills: 0 | DISCHARGE

## 2020-09-01 RX ORDER — METHOTREXATE 2.5 MG/1
0 TABLET ORAL
Qty: 0 | Refills: 0 | DISCHARGE

## 2020-09-01 RX ADMIN — Medication 1 MILLIGRAM(S): at 13:31

## 2020-09-01 RX ADMIN — Medication 250 MILLIGRAM(S): at 16:22

## 2020-09-01 RX ADMIN — Medication 1 TABLET(S): at 13:27

## 2020-09-01 RX ADMIN — Medication 250 MILLIGRAM(S): at 05:34

## 2020-09-01 RX ADMIN — PIPERACILLIN AND TAZOBACTAM 25 GRAM(S): 4; .5 INJECTION, POWDER, LYOPHILIZED, FOR SOLUTION INTRAVENOUS at 05:34

## 2020-09-01 NOTE — DISCHARGE NOTE PROVIDER - NSDCCPCAREPLAN_GEN_ALL_CORE_FT
PRINCIPAL DISCHARGE DIAGNOSIS  Diagnosis: Sepsis  Assessment and Plan of Treatment: Due to Left Pyelonephritis.  Sepsis resolved.  Continue antibiotics as prescribed.  Follow up with PMD in 1 week.      SECONDARY DISCHARGE DIAGNOSES  Diagnosis: Rheumatoid arthritis  Assessment and Plan of Treatment: Follow up with PMD and Rheumatology prior to resuming Methotrexate.    Diagnosis: Pyelonephritis  Assessment and Plan of Treatment: Continue antibiotics as prescribed.  Follow up with Urology in 1 week.

## 2020-09-01 NOTE — DISCHARGE NOTE PROVIDER - PROVIDER TOKENS
PROVIDER:[TOKEN:[[id number]]],PROVIDER:[TOKEN:[[id number]]],PROVIDER:[TOKEN:[[id number]]]

## 2020-09-01 NOTE — DISCHARGE NOTE NURSING/CASE MANAGEMENT/SOCIAL WORK - PATIENT PORTAL LINK FT
You can access the FollowMyHealth Patient Portal offered by [facility] by registering at the following website: [email or web address]. By joining [facility]’s FollowMyHealth portal, you will also be able to view your health information using other applications (apps) compatible with our system.

## 2020-09-01 NOTE — DISCHARGE NOTE PROVIDER - CARE PROVIDER_API CALL
Fahad Ko  INTERNAL MEDICINE  [address] [place] [address]  Phone: [phone]  Fax: [phone]  Follow Up Time:     Phil Hewitt  UROLOGY  [address]  [place] [address]  Phone: [phone]  Fax: [phone]  Follow Up Time:     Sampson Armstrong  INTERNAL MEDICINE  [address] [place] [id number]  Phone: [phone]  Fax: [phone]  Follow Up Time:

## 2020-09-01 NOTE — DISCHARGE NOTE PROVIDER - HOSPITAL COURSE
60 years old female with PMH of Rheumatoid Arthritis on Methotrexate presented with dysuria and urinary frequency associated with left sided flank pain for about 3 days despite being on amoxicillin as prophylactic antibiotics after recent dental procedure -- found to be febrile and tachycardic with positive UA. Admitted with Sepsis secondary to Left Pyelonephritis. Started on broad spectrum antibiotics given history of RA. Urology was consulted due to suspected left UPJ obstruction on CT. Urology recommended Lasix Renal Scan which showed:    ~ Obstructed left kidney with good flow and function.    ~ Good flow to and function of the right kidney with no evidence of obstruction.    ~ Differential renal function: Right kidney 44 %; left kidney 56 %. Function contributed by each kidney to the overall renal function is proportional to its size.    No intervention as per Urology.    Urine culture grew E. Coli. Blood cultures were negative. Sepsis resolved. She is feeling much better and is stable for discharge.         PHYSICAL EXAM:    Vital Signs     T(C): 36.9 (01 Sep 2020 07:39), Max: 36.9 (01 Sep 2020 07:39)    T(F): 98.4 (01 Sep 2020 07:39), Max: 98.4 (01 Sep 2020 07:39)    HR: 66 (01 Sep 2020 07:39) (66 - 81)    BP: 143/84 (01 Sep 2020 07:39) (126/72 - 143/84)    RR: 18 (01 Sep 2020 07:39) (18 - 18)    SpO2: 98% (01 Sep 2020 07:39) (98% - 98%)    General: Well developed. Well nourished. No acute distress    HEENT: PERRLA. EOMI. Clear conjunctivae. Moist mucus membrane    Neck: Supple.     Chest: CTA bilaterally. No wheezing, rales or rhonchi.     Heart: Normal S1 & S2. RRR.     Abdomen: Obese. Soft. Non-tender. + BS. No DVA tenderness.    Ext: No pedal edema. No calf tenderness     Neuro: AAO x 3. No focal deficit. No speech disorder    Skin: Warm and Dry    Psychiatry: Normal mood and affect        Time spent: 40 minutes

## 2020-09-01 NOTE — DISCHARGE NOTE PROVIDER - YES NO FOR MLM POSITIVE OR NEGATIVE COVID RESULT
06/19/2020  Jelani Whaley is a 52 y.o., male.    Anesthesia Evaluation    I have reviewed the Patient Summary Reports.    I have reviewed the Nursing Notes.    I have reviewed the Medications.     Review of Systems  Anesthesia Hx:  No problems with previous Anesthesia  History of prior surgery of interest to airway management or planning: Previous anesthesia: General  Denies Personal Hx of Anesthesia complications.   Social:  Non-Smoker    Hematology/Oncology:  Hematology Normal   Oncology Normal     EENT/Dental:EENT/Dental Normal   Cardiovascular:   Exercise tolerance: good Hypertension  Functional Capacity good / => 4 METS    Pulmonary:  Pulmonary Normal    Renal/:  Renal/ Normal     Hepatic/GI:  Hepatic/GI Normal    Musculoskeletal:  Musculoskeletal Normal    Neurological:  Neurology Normal    Endocrine:  Endocrine Normal    Dermatological:  Skin Normal    Psych:  Psychiatric Normal           Physical Exam  General:  Well nourished, Morbid Obesity    Airway/Jaw/Neck:  Airway Findings: Mouth Opening: Normal Tongue: Normal  Mallampati: III      Dental:  Dental Findings: In tact   Chest/Lungs:  Chest/Lungs Clear    Heart/Vascular:  Heart Findings: Normal Heart murmur: negative       Mental Status:  Mental Status Findings:  Cooperative, Alert and Oriented         Anesthesia Plan  Type of Anesthesia, risks & benefits discussed:  Anesthesia Type:  MAC  Patient's Preference:   Intra-op Monitoring Plan: standard ASA monitors  Intra-op Monitoring Plan Comments:   Post Op Pain Control Plan:   Post Op Pain Control Plan Comments:   Induction:   IV  Beta Blocker:  Patient is not currently on a Beta-Blocker (No further documentation required).       Informed Consent: Patient understands risks and agrees with Anesthesia plan.  Questions answered. Anesthesia consent signed with patient.  ASA Score: 2     Day of  Surgery Review of History & Physical: I have interviewed and examined the patient. I have reviewed the patient's H&P dated:  There are no significant changes.          Ready For Surgery From Anesthesia Perspective.        ,

## 2020-09-01 NOTE — DISCHARGE NOTE PROVIDER - CARE PROVIDERS DIRECT ADDRESSES
,DirectAddress_Unknown,[email or web address],DirectAddress_Unknown

## 2020-09-01 NOTE — DISCHARGE NOTE PROVIDER - NSDCMRMEDTOKEN_GEN_ALL_CORE_FT
acetaminophen 325 mg oral tablet: 2 tab(s) orally every 6 hours, As needed, Temp greater or equal to 38C (100.4F), Mild Pain (1 - 3), Moderate Pain (4 - 6)  folic acid 1 mg oral tablet: 1 tab(s) orally once a day  levoFLOXacin 750 mg oral tablet: 1 tab(s) orally every 24 hours   methotrexate 2.5 mg oral tablet: 5 tab(s) orally once a week on Mondays. Follow up with PMD / Rheumatology prior to resuming.   Multiple Vitamins oral tablet: 1 tab(s) orally once a day  saccharomyces boulardii lyo 250 mg oral capsule: 1 cap(s) orally 2 times a day

## 2020-09-03 LAB
CULTURE RESULTS: SIGNIFICANT CHANGE UP
CULTURE RESULTS: SIGNIFICANT CHANGE UP
SPECIMEN SOURCE: SIGNIFICANT CHANGE UP
SPECIMEN SOURCE: SIGNIFICANT CHANGE UP

## 2020-09-08 ENCOUNTER — APPOINTMENT (OUTPATIENT)
Dept: RHEUMATOLOGY | Facility: CLINIC | Age: 60
End: 2020-09-08
Payer: COMMERCIAL

## 2020-09-08 VITALS
HEIGHT: 61 IN | TEMPERATURE: 97.5 F | DIASTOLIC BLOOD PRESSURE: 76 MMHG | SYSTOLIC BLOOD PRESSURE: 112 MMHG | BODY MASS INDEX: 31.72 KG/M2 | RESPIRATION RATE: 17 BRPM | WEIGHT: 168 LBS

## 2020-09-08 PROCEDURE — 90686 IIV4 VACC NO PRSV 0.5 ML IM: CPT

## 2020-09-08 PROCEDURE — G0008: CPT

## 2020-09-08 PROCEDURE — 99214 OFFICE O/P EST MOD 30 MIN: CPT | Mod: 25

## 2020-09-08 NOTE — PHYSICAL EXAM
[General Appearance - Alert] : alert [General Appearance - In No Acute Distress] : in no acute distress [Sclera] : the sclera and conjunctiva were normal [Skin Color & Pigmentation] : normal skin color and pigmentation [Skin Turgor] : normal skin turgor [Oriented To Time, Place, And Person] : oriented to person, place, and time [Affect] : the affect was normal [Impaired Insight] : insight and judgment were intact [Outer Ear] : the ears and nose were normal in appearance [Oropharynx] : the oropharynx was normal [Neck Cervical Mass (___cm)] : no neck mass was observed [Neck Appearance] : the appearance of the neck was normal [Thyroid Nodule] : there were no palpable thyroid nodules [Thyroid Diffuse Enlargement] : the thyroid was not enlarged [Jugular Venous Distention Increased] : there was no jugular-venous distention [Auscultation Breath Sounds / Voice Sounds] : lungs were clear to auscultation bilaterally [Heart Sounds] : normal S1 and S2 [Heart Rate And Rhythm] : heart rate was normal and rhythm regular [Heart Sounds Gallop] : no gallops [Murmurs] : no murmurs [Heart Sounds Pericardial Friction Rub] : no pericardial rub [Edema] : there was no peripheral edema [Bowel Sounds] : normal bowel sounds [] : no hepato-splenomegaly [Abdomen Soft] : soft [Abdomen Tenderness] : non-tender [Cervical Lymph Nodes Enlarged Posterior Bilaterally] : posterior cervical [Cervical Lymph Nodes Enlarged Anterior Bilaterally] : anterior cervical [Abdomen Mass (___ Cm)] : no abdominal mass palpated [Supraclavicular Lymph Nodes Enlarged Bilaterally] : supraclavicular [No Spinal Tenderness] : no spinal tenderness [No Focal Deficits] : no focal deficits [FreeTextEntry1] : No visible joint swelling; full ROM in all joints

## 2020-09-08 NOTE — HISTORY OF PRESENT ILLNESS
[Arthralgias] : arthralgias [FreeTextEntry1] : Pt reports that she was recently hospitalized for pyelonephritis, for which she was treated w/ abx.  Now feeling much better.  Due to the infection, she has been off MTX, currently c/o pain in her joints, tricia in her hands. [Weight Loss] : no weight loss [Anorexia] : no anorexia [Malaise] : no malaise [Chills] : no chills [Fever] : no fever [Fatigue] : no fatigue [Skin Lesions] : no lesions [Malar Facial Rash] : no malar facial rash [Skin Nodules] : no skin nodules [Oral Ulcers] : no oral ulcers [Cough] : no cough [Shortness of Breath] : no shortness of breath [Dysphagia] : no dysphagia [Dry Mouth] : no dry mouth [Chest Pain] : no chest pain [Joint Swelling] : no joint swelling [Joint Deformity] : no joint deformity [Joint Warmth] : no joint warmth [Morning Stiffness] : no morning stiffness [Decreased ROM] : no decreased range of motion [Falls] : no falls [Difficulty Standing] : no difficulty standing [Difficulty Walking] : no difficulty walking [Dyspnea] : no dyspnea [Muscle Weakness] : no muscle weakness [Myalgias] : no myalgias [Muscle Spasms] : no muscle spasms [Visual Changes] : no visual changes [Eye Pain] : no eye pain [Muscle Cramping] : no muscle cramping [Dry Eyes] : no dry eyes [Eye Redness] : no eye redness

## 2020-09-08 NOTE — ASSESSMENT
[FreeTextEntry1] : 58 year old female with:\par 1)  Long-standing RA (+RF, +CCP), had been well controlled on MTX, though currently w/ mild to moderate disease activity since holding MTX for an infection (pyelonephritis).  Also w/ persistently elevated ESR/CRP.\par   - Pt to see  tomorrow.  If OK w/ , will restart MTX 12.5mg weekly, folic acid 1mg daily.\par   - Hepatitis panel negative 10/18.\par   - Administered flu vaccine.   Prevnar (7/19) UTD.  Will plan to administer Pneumovax at next visit.\par 2)  Pain in B/L heels:  most c/w plantar fasciitis - resolved\par   - heel stretching exercises\par   - Celebrex prn\par   - roll frozen water bottle under heels prn\par 3)  Pain in shoulders (R>L):  most c/w RTC tendinopathy.\par   - shoulder exercises\par   - Celebrex prn\par 4)  Borderline DAVIN:  no si/sx of other CTD and all serologies negative - ?due to RA\par 5)  Recent CXR by PMD revealed a 2.5cm opacity of unclear etiology.  Reportedly was stable upon repeat.\par   - f/u w/ PMD\par 6)  Persistently elevated ESR:  unlikely due to RA, as pt well controlled clinically\par   - Again advised pt to f/u w/ PMD for further evaluation into other possible etiologies.\par 7)  Painless clicking in neck and hips (L>R):  improving / occurring less frequently\par   - Cont exercises.

## 2020-09-09 ENCOUNTER — APPOINTMENT (OUTPATIENT)
Dept: UROLOGY | Facility: CLINIC | Age: 60
End: 2020-09-09
Payer: COMMERCIAL

## 2020-09-09 VITALS — TEMPERATURE: 97.3 F

## 2020-09-09 PROBLEM — M06.9 RHEUMATOID ARTHRITIS, UNSPECIFIED: Chronic | Status: ACTIVE | Noted: 2020-08-29

## 2020-09-09 PROCEDURE — 99215 OFFICE O/P EST HI 40 MIN: CPT

## 2020-09-14 LAB — BACTERIA UR CULT: NORMAL

## 2020-10-23 ENCOUNTER — RX RENEWAL (OUTPATIENT)
Age: 60
End: 2020-10-23

## 2020-12-10 ENCOUNTER — APPOINTMENT (OUTPATIENT)
Dept: RHEUMATOLOGY | Facility: CLINIC | Age: 60
End: 2020-12-10
Payer: COMMERCIAL

## 2020-12-10 VITALS
TEMPERATURE: 97.5 F | DIASTOLIC BLOOD PRESSURE: 82 MMHG | OXYGEN SATURATION: 99 % | SYSTOLIC BLOOD PRESSURE: 132 MMHG | RESPIRATION RATE: 17 BRPM | BODY MASS INDEX: 33.23 KG/M2 | WEIGHT: 176 LBS | HEART RATE: 67 BPM | HEIGHT: 61 IN

## 2020-12-10 DIAGNOSIS — M06.9 RHEUMATOID ARTHRITIS, UNSPECIFIED: ICD-10-CM

## 2020-12-10 DIAGNOSIS — Z23 ENCOUNTER FOR IMMUNIZATION: ICD-10-CM

## 2020-12-10 PROCEDURE — G0009: CPT

## 2020-12-10 PROCEDURE — 99072 ADDL SUPL MATRL&STAF TM PHE: CPT

## 2020-12-10 PROCEDURE — 36415 COLL VENOUS BLD VENIPUNCTURE: CPT

## 2020-12-10 PROCEDURE — 90732 PPSV23 VACC 2 YRS+ SUBQ/IM: CPT

## 2020-12-10 PROCEDURE — 99214 OFFICE O/P EST MOD 30 MIN: CPT | Mod: 25

## 2020-12-10 NOTE — HISTORY OF PRESENT ILLNESS
[Arthralgias] : arthralgias [FreeTextEntry1] : Had been feeling better until about 1 week ago when she began to experience pain in her left shoulder, then right shoulder.  It is now beginning to improve.  She also reports that she underwent a recent CT chest and the lesion seen on previous x-rays was found to be benign. [Anorexia] : no anorexia [Weight Loss] : no weight loss [Malaise] : no malaise [Fever] : no fever [Chills] : no chills [Fatigue] : no fatigue [Malar Facial Rash] : no malar facial rash [Skin Lesions] : no lesions [Skin Nodules] : no skin nodules [Oral Ulcers] : no oral ulcers [Cough] : no cough [Dry Mouth] : no dry mouth [Dysphagia] : no dysphagia [Shortness of Breath] : no shortness of breath [Chest Pain] : no chest pain [Joint Swelling] : no joint swelling [Joint Warmth] : no joint warmth [Joint Deformity] : no joint deformity [Decreased ROM] : no decreased range of motion [Morning Stiffness] : no morning stiffness [Falls] : no falls [Difficulty Standing] : no difficulty standing [Difficulty Walking] : no difficulty walking [Dyspnea] : no dyspnea [Myalgias] : no myalgias [Muscle Weakness] : no muscle weakness [Muscle Spasms] : no muscle spasms [Muscle Cramping] : no muscle cramping [Visual Changes] : no visual changes [Eye Pain] : no eye pain [Eye Redness] : no eye redness [Dry Eyes] : no dry eyes

## 2020-12-10 NOTE — PHYSICAL EXAM
[General Appearance - Alert] : alert [General Appearance - In No Acute Distress] : in no acute distress [Sclera] : the sclera and conjunctiva were normal [Outer Ear] : the ears and nose were normal in appearance [Oropharynx] : the oropharynx was normal [Neck Appearance] : the appearance of the neck was normal [Neck Cervical Mass (___cm)] : no neck mass was observed [Jugular Venous Distention Increased] : there was no jugular-venous distention [Thyroid Diffuse Enlargement] : the thyroid was not enlarged [Thyroid Nodule] : there were no palpable thyroid nodules [Auscultation Breath Sounds / Voice Sounds] : lungs were clear to auscultation bilaterally [Heart Rate And Rhythm] : heart rate was normal and rhythm regular [Heart Sounds] : normal S1 and S2 [Heart Sounds Gallop] : no gallops [Murmurs] : no murmurs [Heart Sounds Pericardial Friction Rub] : no pericardial rub [Edema] : there was no peripheral edema [Bowel Sounds] : normal bowel sounds [Abdomen Soft] : soft [Abdomen Tenderness] : non-tender [Abdomen Mass (___ Cm)] : no abdominal mass palpated [Cervical Lymph Nodes Enlarged Posterior Bilaterally] : posterior cervical [Cervical Lymph Nodes Enlarged Anterior Bilaterally] : anterior cervical [Supraclavicular Lymph Nodes Enlarged Bilaterally] : supraclavicular [No Spinal Tenderness] : no spinal tenderness [Skin Color & Pigmentation] : normal skin color and pigmentation [Skin Turgor] : normal skin turgor [] : no rash [No Focal Deficits] : no focal deficits [Oriented To Time, Place, And Person] : oriented to person, place, and time [Impaired Insight] : insight and judgment were intact [Affect] : the affect was normal [FreeTextEntry1] : No visible joint swelling;  B/L shoulder w/ pain upon forward flexion and abduction, (+)impingement;  full ROM in rest of joints

## 2020-12-10 NOTE — ASSESSMENT
[FreeTextEntry1] : 58 year old female with:\par 1)  Long-standing RA (+RF, +CCP), clincially well controlled on MTX.  Also w/ persistently elevated ESR/CRP.\par   - Pt to see  tomorrow.  If OK w/ , will restart MTX 12.5mg weekly, folic acid 1mg daily.\par   - Hepatitis panel negative 10/18.\par   - Flu vaccine (9/20), Prevnar (7/19) UTD.  Administered Pneumovax.\par   - Check labs, x-rays\par 2)  Pain in B/L heels:  most c/w plantar fasciitis - resolved\par   - heel stretching exercises\par   - Celebrex prn\par   - roll frozen water bottle under heels prn\par 3)  Pain in shoulders  most c/w RTC tendinopathy.\par   - shoulder exercises\par   - Celebrex prn\par 4)  Borderline DAVIN:  no si/sx of other CTD and all serologies negative - ?due to RA\par 5)  Recent CXR by PMD revealed a 2.5cm opacity of unclear etiology.  Reportedly was stable upon repeat.\par   - f/u w/ PMD\par 6)  Persistently elevated ESR:  unlikely due to RA, as pt well controlled clinically\par   - Again advised pt to f/u w/ PMD for further evaluation into other possible etiologies.\par 7)  Painless clicking in neck and hips (L>R):  improving / occurring less frequently\par   - Cont exercises.

## 2020-12-11 LAB
ALBUMIN SERPL ELPH-MCNC: 4.6 G/DL
ALP BLD-CCNC: 80 U/L
ALT SERPL-CCNC: 16 U/L
ANION GAP SERPL CALC-SCNC: 10 MMOL/L
AST SERPL-CCNC: 18 U/L
BASOPHILS # BLD AUTO: 0.04 K/UL
BASOPHILS NFR BLD AUTO: 0.6 %
BILIRUB SERPL-MCNC: 0.2 MG/DL
BUN SERPL-MCNC: 12 MG/DL
CALCIUM SERPL-MCNC: 9.4 MG/DL
CHLORIDE SERPL-SCNC: 99 MMOL/L
CO2 SERPL-SCNC: 28 MMOL/L
CREAT SERPL-MCNC: 0.62 MG/DL
CRP SERPL-MCNC: 1.71 MG/DL
EOSINOPHIL # BLD AUTO: 0.15 K/UL
EOSINOPHIL NFR BLD AUTO: 2.1 %
ERYTHROCYTE [SEDIMENTATION RATE] IN BLOOD BY WESTERGREN METHOD: 96 MM/HR
GLUCOSE SERPL-MCNC: 89 MG/DL
HCT VFR BLD CALC: 42.4 %
HGB BLD-MCNC: 12.8 G/DL
IMM GRANULOCYTES NFR BLD AUTO: 0 %
LYMPHOCYTES # BLD AUTO: 3.82 K/UL
LYMPHOCYTES NFR BLD AUTO: 53.7 %
MAN DIFF?: NORMAL
MCHC RBC-ENTMCNC: 25.9 PG
MCHC RBC-ENTMCNC: 30.2 GM/DL
MCV RBC AUTO: 85.8 FL
MONOCYTES # BLD AUTO: 0.57 K/UL
MONOCYTES NFR BLD AUTO: 8 %
NEUTROPHILS # BLD AUTO: 2.54 K/UL
NEUTROPHILS NFR BLD AUTO: 35.6 %
PLATELET # BLD AUTO: 286 K/UL
POTASSIUM SERPL-SCNC: 4.2 MMOL/L
PROT SERPL-MCNC: 7.6 G/DL
RBC # BLD: 4.94 M/UL
RBC # FLD: 15.5 %
SODIUM SERPL-SCNC: 137 MMOL/L
WBC # FLD AUTO: 7.12 K/UL

## 2021-02-05 ENCOUNTER — OUTPATIENT (OUTPATIENT)
Dept: OUTPATIENT SERVICES | Facility: HOSPITAL | Age: 61
LOS: 1 days | End: 2021-02-05
Payer: COMMERCIAL

## 2021-02-05 ENCOUNTER — APPOINTMENT (OUTPATIENT)
Dept: RADIOLOGY | Facility: CLINIC | Age: 61
End: 2021-02-05
Payer: COMMERCIAL

## 2021-02-05 DIAGNOSIS — M05.79 RHEUMATOID ARTHRITIS WITH RHEUMATOID FACTOR OF MULTIPLE SITES WITHOUT ORGAN OR SYSTEMS INVOLVEMENT: ICD-10-CM

## 2021-02-05 DIAGNOSIS — Z90.49 ACQUIRED ABSENCE OF OTHER SPECIFIED PARTS OF DIGESTIVE TRACT: Chronic | ICD-10-CM

## 2021-02-05 PROCEDURE — 73030 X-RAY EXAM OF SHOULDER: CPT | Mod: 26,50

## 2021-02-05 PROCEDURE — 73130 X-RAY EXAM OF HAND: CPT | Mod: 26,50

## 2021-02-05 PROCEDURE — 73130 X-RAY EXAM OF HAND: CPT

## 2021-02-05 PROCEDURE — 73630 X-RAY EXAM OF FOOT: CPT | Mod: 26,50

## 2021-02-05 PROCEDURE — 73562 X-RAY EXAM OF KNEE 3: CPT | Mod: 26,50

## 2021-02-05 PROCEDURE — 73030 X-RAY EXAM OF SHOULDER: CPT

## 2021-02-05 PROCEDURE — 73630 X-RAY EXAM OF FOOT: CPT

## 2021-02-05 PROCEDURE — 73562 X-RAY EXAM OF KNEE 3: CPT

## 2021-02-15 ENCOUNTER — RESULT REVIEW (OUTPATIENT)
Age: 61
End: 2021-02-15

## 2021-03-04 ENCOUNTER — APPOINTMENT (OUTPATIENT)
Dept: RHEUMATOLOGY | Facility: CLINIC | Age: 61
End: 2021-03-04
Payer: COMMERCIAL

## 2021-03-04 VITALS
RESPIRATION RATE: 17 BRPM | HEART RATE: 71 BPM | OXYGEN SATURATION: 97 % | WEIGHT: 179 LBS | BODY MASS INDEX: 33.79 KG/M2 | HEIGHT: 61 IN | SYSTOLIC BLOOD PRESSURE: 122 MMHG | DIASTOLIC BLOOD PRESSURE: 76 MMHG

## 2021-03-04 PROCEDURE — 99072 ADDL SUPL MATRL&STAF TM PHE: CPT

## 2021-03-04 PROCEDURE — 99214 OFFICE O/P EST MOD 30 MIN: CPT

## 2021-03-04 NOTE — HISTORY OF PRESENT ILLNESS
[Arthralgias] : arthralgias [FreeTextEntry1] : Feeling fine overall since last visit.  RA remains well controlled overall.  Still w/ mild B/L shoulder pain.  Also w/ mild knee pain w/ associated clicking.  She reports that she received the first dose of the COVID vaccine.  No new complaints. [Anorexia] : no anorexia [Weight Loss] : no weight loss [Malaise] : no malaise [Fever] : no fever [Chills] : no chills [Fatigue] : no fatigue [Malar Facial Rash] : no malar facial rash [Skin Lesions] : no lesions [Skin Nodules] : no skin nodules [Oral Ulcers] : no oral ulcers [Cough] : no cough [Dry Mouth] : no dry mouth [Dysphagia] : no dysphagia [Shortness of Breath] : no shortness of breath [Chest Pain] : no chest pain [Joint Swelling] : no joint swelling [Joint Warmth] : no joint warmth [Joint Deformity] : no joint deformity [Decreased ROM] : no decreased range of motion [Morning Stiffness] : no morning stiffness [Falls] : no falls [Difficulty Standing] : no difficulty standing [Difficulty Walking] : no difficulty walking [Dyspnea] : no dyspnea [Myalgias] : no myalgias [Muscle Weakness] : no muscle weakness [Muscle Spasms] : no muscle spasms [Muscle Cramping] : no muscle cramping [Visual Changes] : no visual changes [Eye Pain] : no eye pain [Eye Redness] : no eye redness [Dry Eyes] : no dry eyes

## 2021-03-04 NOTE — ASSESSMENT
[FreeTextEntry1] : 58 year old female with:\par 1)  Long-standing RA (+RF, +CCP), clinically well controlled on MTX.  Also w/ persistently elevated ESR/CRP.\par   - Cont MTX 12.5mg weekly, folic acid 1mg daily.\par   - Hepatitis panel negative 10/18.\par   - Flu vaccine (9/20), Prevnar (7/19), Pneumovax (12/20) UTD.  Pt s/p 1st dose of COVID vaccine.  Advised pt to hold MTX for 1 week after 2nd dose.\par   - Check labs.\par 2)  Pain in B/L heels:  most c/w plantar fasciitis - resolved\par   - heel stretching exercises\par   - Celebrex prn\par   - roll frozen water bottle under heels prn\par 3)  Pain in shoulders  most c/w RTC tendinopathy.\par   - shoulder exercises\par   - Celebrex prn\par 4)  Borderline DAVIN:  no si/sx of other CTD and all serologies negative - ?due to RA\par 5)  Recent CXR by PMD revealed a 2.5cm opacity of unclear etiology.  CT revealed benign lesion.\par 6)  Persistently elevated ESR:  unlikely due to RA, as pt well controlled clinically\par   - Again advised pt to f/u w/ PMD for further evaluation into other possible etiologies.\par 7)  Pain in knees:  due to OA\par   - Reiterated importance of exercise\par   - ibuprofen and/or Tylenol prn\par   - wt loss\par   - warm compresses\par   - OTC topical analgesics

## 2021-03-05 LAB
ALBUMIN SERPL ELPH-MCNC: 4.2 G/DL
ALP BLD-CCNC: 76 U/L
ALT SERPL-CCNC: 15 U/L
ANION GAP SERPL CALC-SCNC: 11 MMOL/L
AST SERPL-CCNC: 16 U/L
BASOPHILS # BLD AUTO: 0.04 K/UL
BASOPHILS NFR BLD AUTO: 0.7 %
BILIRUB SERPL-MCNC: 0.3 MG/DL
BUN SERPL-MCNC: 12 MG/DL
CALCIUM SERPL-MCNC: 9.4 MG/DL
CHLORIDE SERPL-SCNC: 103 MMOL/L
CO2 SERPL-SCNC: 28 MMOL/L
CREAT SERPL-MCNC: 0.71 MG/DL
CRP SERPL-MCNC: 15 MG/L
EOSINOPHIL # BLD AUTO: 0.14 K/UL
EOSINOPHIL NFR BLD AUTO: 2.4 %
ERYTHROCYTE [SEDIMENTATION RATE] IN BLOOD BY WESTERGREN METHOD: 84 MM/HR
GLUCOSE SERPL-MCNC: 111 MG/DL
HCT VFR BLD CALC: 40.9 %
HGB BLD-MCNC: 12.9 G/DL
IMM GRANULOCYTES NFR BLD AUTO: 0.3 %
LYMPHOCYTES # BLD AUTO: 2.38 K/UL
LYMPHOCYTES NFR BLD AUTO: 41.2 %
MAN DIFF?: NORMAL
MCHC RBC-ENTMCNC: 26.9 PG
MCHC RBC-ENTMCNC: 31.5 GM/DL
MCV RBC AUTO: 85.4 FL
MONOCYTES # BLD AUTO: 0.56 K/UL
MONOCYTES NFR BLD AUTO: 9.7 %
NEUTROPHILS # BLD AUTO: 2.63 K/UL
NEUTROPHILS NFR BLD AUTO: 45.7 %
PLATELET # BLD AUTO: 294 K/UL
POTASSIUM SERPL-SCNC: 4.2 MMOL/L
PROT SERPL-MCNC: 7.1 G/DL
RBC # BLD: 4.79 M/UL
RBC # FLD: 15.9 %
SODIUM SERPL-SCNC: 141 MMOL/L
WBC # FLD AUTO: 5.77 K/UL

## 2021-03-08 NOTE — ED PROVIDER NOTE - HIV OFFER
Patient states forms will be sent over by fax for him to get a total knee replacement. Patient states he would like the forms sent back ASAP.    Opt out

## 2021-03-09 ENCOUNTER — OUTPATIENT (OUTPATIENT)
Dept: OUTPATIENT SERVICES | Facility: HOSPITAL | Age: 61
LOS: 1 days | End: 2021-03-09
Payer: COMMERCIAL

## 2021-03-09 ENCOUNTER — APPOINTMENT (OUTPATIENT)
Dept: UROLOGY | Facility: CLINIC | Age: 61
End: 2021-03-09
Payer: COMMERCIAL

## 2021-03-09 DIAGNOSIS — Z90.49 ACQUIRED ABSENCE OF OTHER SPECIFIED PARTS OF DIGESTIVE TRACT: Chronic | ICD-10-CM

## 2021-03-09 DIAGNOSIS — R35.0 FREQUENCY OF MICTURITION: ICD-10-CM

## 2021-03-09 PROCEDURE — 99072 ADDL SUPL MATRL&STAF TM PHE: CPT

## 2021-03-09 PROCEDURE — 76775 US EXAM ABDO BACK WALL LIM: CPT | Mod: 26

## 2021-03-09 PROCEDURE — 76775 US EXAM ABDO BACK WALL LIM: CPT

## 2021-03-09 PROCEDURE — 99212 OFFICE O/P EST SF 10 MIN: CPT | Mod: 25

## 2021-03-10 NOTE — HISTORY OF PRESENT ILLNESS
[FreeTextEntry1] : Patient here for evaluation of left UPJ issues.\par End of August noted dysuria and pain after urination for a few days; then she had left flank pain with a fever to 101 with no N/V or chills. Never had gross hematuria. Admitted to [place]. HAd Non-contrast CT scan noting left hydronephrosis to UPJ and no stones. She had renal scan a few days later noting quick bilateral uptake, 44 V 56% function and T1/2 washout 24 minutes. She was treated with IV antibiotics and resolved quickly. \par no prior h/o left flank pain or UTIs. GRF >60.\par \par reviewed films online: see ULS 2/20 which noted a left parapelvic cyst - i think seeing the renal pelvis\par reviewed CT scan with patient:; good paranchyma - like right and noted the MAG3 scan. \par \par no further flank pains or UTI\par ULS today - extrarenal pelvic with NO calyceal dilatation

## 2021-03-11 DIAGNOSIS — N13.5 CROSSING VESSEL AND STRICTURE OF URETER WITHOUT HYDRONEPHROSIS: ICD-10-CM

## 2021-05-03 ENCOUNTER — OUTPATIENT (OUTPATIENT)
Dept: OUTPATIENT SERVICES | Facility: HOSPITAL | Age: 61
LOS: 1 days | End: 2021-05-03
Payer: COMMERCIAL

## 2021-05-03 ENCOUNTER — APPOINTMENT (OUTPATIENT)
Dept: ULTRASOUND IMAGING | Facility: CLINIC | Age: 61
End: 2021-05-03
Payer: COMMERCIAL

## 2021-05-03 ENCOUNTER — APPOINTMENT (OUTPATIENT)
Dept: MAMMOGRAPHY | Facility: CLINIC | Age: 61
End: 2021-05-03
Payer: COMMERCIAL

## 2021-05-03 DIAGNOSIS — R92.8 OTHER ABNORMAL AND INCONCLUSIVE FINDINGS ON DIAGNOSTIC IMAGING OF BREAST: ICD-10-CM

## 2021-05-03 DIAGNOSIS — Z00.8 ENCOUNTER FOR OTHER GENERAL EXAMINATION: ICD-10-CM

## 2021-05-03 DIAGNOSIS — Z90.49 ACQUIRED ABSENCE OF OTHER SPECIFIED PARTS OF DIGESTIVE TRACT: Chronic | ICD-10-CM

## 2021-05-03 PROCEDURE — 77066 DX MAMMO INCL CAD BI: CPT | Mod: 26

## 2021-05-03 PROCEDURE — G0279: CPT | Mod: 26

## 2021-05-03 PROCEDURE — 76641 ULTRASOUND BREAST COMPLETE: CPT | Mod: 26,50

## 2021-05-03 PROCEDURE — G0279: CPT

## 2021-05-03 PROCEDURE — 76641 ULTRASOUND BREAST COMPLETE: CPT

## 2021-05-03 PROCEDURE — 77066 DX MAMMO INCL CAD BI: CPT

## 2021-06-01 ENCOUNTER — APPOINTMENT (OUTPATIENT)
Dept: RHEUMATOLOGY | Facility: CLINIC | Age: 61
End: 2021-06-01
Payer: COMMERCIAL

## 2021-06-01 VITALS
SYSTOLIC BLOOD PRESSURE: 126 MMHG | RESPIRATION RATE: 17 BRPM | OXYGEN SATURATION: 98 % | HEART RATE: 66 BPM | WEIGHT: 178 LBS | HEIGHT: 61 IN | BODY MASS INDEX: 33.61 KG/M2 | DIASTOLIC BLOOD PRESSURE: 76 MMHG | TEMPERATURE: 98 F

## 2021-06-01 PROCEDURE — 99214 OFFICE O/P EST MOD 30 MIN: CPT

## 2021-06-01 PROCEDURE — 99072 ADDL SUPL MATRL&STAF TM PHE: CPT

## 2021-06-01 NOTE — HISTORY OF PRESENT ILLNESS
[Arthralgias] : arthralgias [FreeTextEntry1] : Continues to feel fine overall.  RA remains well controlled overall.  B/L shoulder pain has virtually resolved.  Still w/ mild B/L knee pain w/ associated clicking - occurs primarily when first getting up after prolonged sitting.  She also c/o pain along her lateral left foot   No other new complaints. [Anorexia] : no anorexia [Weight Loss] : no weight loss [Malaise] : no malaise [Fever] : no fever [Chills] : no chills [Fatigue] : no fatigue [Malar Facial Rash] : no malar facial rash [Skin Lesions] : no lesions [Skin Nodules] : no skin nodules [Oral Ulcers] : no oral ulcers [Cough] : no cough [Dry Mouth] : no dry mouth [Dysphagia] : no dysphagia [Shortness of Breath] : no shortness of breath [Chest Pain] : no chest pain [Joint Swelling] : no joint swelling [Joint Warmth] : no joint warmth [Joint Deformity] : no joint deformity [Decreased ROM] : no decreased range of motion [Morning Stiffness] : no morning stiffness [Falls] : no falls [Difficulty Standing] : no difficulty standing [Difficulty Walking] : no difficulty walking [Dyspnea] : no dyspnea [Myalgias] : no myalgias [Muscle Weakness] : no muscle weakness [Muscle Spasms] : no muscle spasms [Muscle Cramping] : no muscle cramping [Visual Changes] : no visual changes [Eye Pain] : no eye pain [Eye Redness] : no eye redness [Dry Eyes] : no dry eyes

## 2021-06-01 NOTE — PHYSICAL EXAM
[General Appearance - Alert] : alert [General Appearance - In No Acute Distress] : in no acute distress [Sclera] : the sclera and conjunctiva were normal [Outer Ear] : the ears and nose were normal in appearance [Oropharynx] : the oropharynx was normal [Neck Appearance] : the appearance of the neck was normal [Neck Cervical Mass (___cm)] : no neck mass was observed [Jugular Venous Distention Increased] : there was no jugular-venous distention [Thyroid Diffuse Enlargement] : the thyroid was not enlarged [Thyroid Nodule] : there were no palpable thyroid nodules [Auscultation Breath Sounds / Voice Sounds] : lungs were clear to auscultation bilaterally [Heart Rate And Rhythm] : heart rate was normal and rhythm regular [Heart Sounds] : normal S1 and S2 [Heart Sounds Gallop] : no gallops [Murmurs] : no murmurs [Heart Sounds Pericardial Friction Rub] : no pericardial rub [Edema] : there was no peripheral edema [Bowel Sounds] : normal bowel sounds [Abdomen Soft] : soft [Abdomen Tenderness] : non-tender [Abdomen Mass (___ Cm)] : no abdominal mass palpated [Cervical Lymph Nodes Enlarged Posterior Bilaterally] : posterior cervical [Cervical Lymph Nodes Enlarged Anterior Bilaterally] : anterior cervical [Supraclavicular Lymph Nodes Enlarged Bilaterally] : supraclavicular [No Spinal Tenderness] : no spinal tenderness [Skin Color & Pigmentation] : normal skin color and pigmentation [Skin Turgor] : normal skin turgor [] : no rash [No Focal Deficits] : no focal deficits [Oriented To Time, Place, And Person] : oriented to person, place, and time [Impaired Insight] : insight and judgment were intact [Affect] : the affect was normal [FreeTextEntry1] : No synovitis;  mild right shoulder pain w/ internal roation;  full ROM in rest of joints

## 2021-06-01 NOTE — ASSESSMENT
[FreeTextEntry1] : 58 year old female with:\par 1)  Long-standing RA (+RF, +CCP), clinically well controlled on MTX.  Also w/ persistently elevated ESR/CRP.\par   - Cont MTX 12.5mg weekly, folic acid 1mg daily.\par   - Hepatitis panel negative 10/18.\par   - Flu vaccine (9/20), Prevnar (7/19), Pneumovax (12/20) UTD.  Pt has received the COVID19 vaccine.  \par   - Check labs.\par 2)  Pain in B/L heels:  most c/w plantar fasciitis - resolved\par   - heel stretching exercises\par   - Celebrex prn\par   - roll frozen water bottle under heels prn\par 3)  Pain in shoulders  most c/w RTC tendinopathy.\par   - shoulder exercises\par   - Celebrex prn\par 4)  Borderline DAVIN:  no si/sx of other CTD and all serologies negative - ?due to RA\par 5)  Recent CXR by PMD revealed a 2.5cm opacity of unclear etiology.  CT revealed benign lesion.\par 6)  Persistently elevated ESR:  unlikely due to RA, as pt well controlled clinically\par   - f/u w/ PMD\par 7)  Pain in knees:  due to OA\par   - Reiterated importance of exercise\par   - ibuprofen and/or Tylenol prn\par   - wt loss\par   - warm compresses\par   - OTC topical analgesics\par 8)  Pain in lateral left foot:  unclear etiology - ?strain.  no evidence of inflammatory process\par   - Avoid activities placing stress on left foot\par   - Celebrex prn\par   - warm compresses\par   - If no improvement by next visit, will consider imaging.

## 2021-06-17 LAB
ALBUMIN SERPL ELPH-MCNC: 4.3 G/DL
ALP BLD-CCNC: 72 U/L
ALT SERPL-CCNC: 20 U/L
ANION GAP SERPL CALC-SCNC: 11 MMOL/L
AST SERPL-CCNC: 18 U/L
BASOPHILS # BLD AUTO: 0.03 K/UL
BASOPHILS NFR BLD AUTO: 0.4 %
BILIRUB SERPL-MCNC: 0.4 MG/DL
BUN SERPL-MCNC: 9 MG/DL
CALCIUM SERPL-MCNC: 9.4 MG/DL
CHLORIDE SERPL-SCNC: 101 MMOL/L
CO2 SERPL-SCNC: 26 MMOL/L
CREAT SERPL-MCNC: 0.54 MG/DL
CRP SERPL-MCNC: 14 MG/L
EOSINOPHIL # BLD AUTO: 0.14 K/UL
EOSINOPHIL NFR BLD AUTO: 2 %
ERYTHROCYTE [SEDIMENTATION RATE] IN BLOOD BY WESTERGREN METHOD: 91 MM/HR
GLUCOSE SERPL-MCNC: 102 MG/DL
HCT VFR BLD CALC: 40.7 %
HGB BLD-MCNC: 12.7 G/DL
IMM GRANULOCYTES NFR BLD AUTO: 0.3 %
LYMPHOCYTES # BLD AUTO: 2.66 K/UL
LYMPHOCYTES NFR BLD AUTO: 37.4 %
MAN DIFF?: NORMAL
MCHC RBC-ENTMCNC: 26.8 PG
MCHC RBC-ENTMCNC: 31.2 GM/DL
MCV RBC AUTO: 85.9 FL
MONOCYTES # BLD AUTO: 0.66 K/UL
MONOCYTES NFR BLD AUTO: 9.3 %
NEUTROPHILS # BLD AUTO: 3.61 K/UL
NEUTROPHILS NFR BLD AUTO: 50.6 %
PLATELET # BLD AUTO: 306 K/UL
POTASSIUM SERPL-SCNC: 4.7 MMOL/L
PROT SERPL-MCNC: 7.4 G/DL
RBC # BLD: 4.74 M/UL
RBC # FLD: 15.8 %
SODIUM SERPL-SCNC: 138 MMOL/L
WBC # FLD AUTO: 7.12 K/UL

## 2021-09-02 ENCOUNTER — LABORATORY RESULT (OUTPATIENT)
Age: 61
End: 2021-09-02

## 2021-09-02 ENCOUNTER — APPOINTMENT (OUTPATIENT)
Dept: RHEUMATOLOGY | Facility: CLINIC | Age: 61
End: 2021-09-02
Payer: COMMERCIAL

## 2021-09-02 VITALS
HEIGHT: 61 IN | WEIGHT: 180 LBS | RESPIRATION RATE: 17 BRPM | DIASTOLIC BLOOD PRESSURE: 74 MMHG | SYSTOLIC BLOOD PRESSURE: 130 MMHG | TEMPERATURE: 98.1 F | BODY MASS INDEX: 33.99 KG/M2 | HEART RATE: 66 BPM | OXYGEN SATURATION: 96 %

## 2021-09-02 LAB
ALBUMIN SERPL ELPH-MCNC: 4.3 G/DL
ALP BLD-CCNC: 73 U/L
ALT SERPL-CCNC: 14 U/L
ANION GAP SERPL CALC-SCNC: 10 MMOL/L
AST SERPL-CCNC: 15 U/L
BILIRUB SERPL-MCNC: 0.2 MG/DL
BUN SERPL-MCNC: 11 MG/DL
CALCIUM SERPL-MCNC: 9.5 MG/DL
CHLORIDE SERPL-SCNC: 103 MMOL/L
CO2 SERPL-SCNC: 27 MMOL/L
CREAT SERPL-MCNC: 0.6 MG/DL
CRP SERPL-MCNC: 13 MG/L
ERYTHROCYTE [SEDIMENTATION RATE] IN BLOOD BY WESTERGREN METHOD: 82 MM/HR
GLUCOSE SERPL-MCNC: 123 MG/DL
POTASSIUM SERPL-SCNC: 4 MMOL/L
PROT SERPL-MCNC: 7.3 G/DL
SODIUM SERPL-SCNC: 139 MMOL/L

## 2021-09-02 PROCEDURE — 99214 OFFICE O/P EST MOD 30 MIN: CPT | Mod: 25

## 2021-09-02 PROCEDURE — 36415 COLL VENOUS BLD VENIPUNCTURE: CPT

## 2021-09-02 NOTE — PHYSICAL EXAM
[General Appearance - Alert] : alert [General Appearance - In No Acute Distress] : in no acute distress [Sclera] : the sclera and conjunctiva were normal [Outer Ear] : the ears and nose were normal in appearance [Oropharynx] : the oropharynx was normal [Neck Appearance] : the appearance of the neck was normal [Jugular Venous Distention Increased] : there was no jugular-venous distention [Neck Cervical Mass (___cm)] : no neck mass was observed [Thyroid Diffuse Enlargement] : the thyroid was not enlarged [Thyroid Nodule] : there were no palpable thyroid nodules [Auscultation Breath Sounds / Voice Sounds] : lungs were clear to auscultation bilaterally [Heart Rate And Rhythm] : heart rate was normal and rhythm regular [Heart Sounds] : normal S1 and S2 [Heart Sounds Gallop] : no gallops [Murmurs] : no murmurs [Heart Sounds Pericardial Friction Rub] : no pericardial rub [Edema] : there was no peripheral edema [Abdomen Soft] : soft [Bowel Sounds] : normal bowel sounds [Abdomen Tenderness] : non-tender [Abdomen Mass (___ Cm)] : no abdominal mass palpated [Cervical Lymph Nodes Enlarged Posterior Bilaterally] : posterior cervical [Cervical Lymph Nodes Enlarged Anterior Bilaterally] : anterior cervical [Supraclavicular Lymph Nodes Enlarged Bilaterally] : supraclavicular [No Spinal Tenderness] : no spinal tenderness [Skin Color & Pigmentation] : normal skin color and pigmentation [Skin Turgor] : normal skin turgor [] : no rash [No Focal Deficits] : no focal deficits [Oriented To Time, Place, And Person] : oriented to person, place, and time [Impaired Insight] : insight and judgment were intact [Affect] : the affect was normal [FreeTextEntry1] : No synovitis;  normal ROM in all joints

## 2021-09-02 NOTE — ASSESSMENT
[FreeTextEntry1] : 58 year old female with:\par 1)  Long-standing RA (+RF, +CCP), clinically well controlled on MTX.  Also w/ persistently elevated ESR/CRP.\par   - Cont MTX 12.5mg weekly, folic acid 1mg daily.\par   - Hepatitis panel negative 10/18.\par   - Flu vaccine (9/20), Prevnar (7/19), Pneumovax (12/20) UTD.  Pt has received the COVID19 vaccine.  As pt is immunocompromised, recommended that she get the booster dose, as per CDC recommendations. \par   - Check labs.\par 2)  Pain in B/L heels:  most c/w plantar fasciitis - resolved\par   - heel stretching exercises\par   - Celebrex prn\par   - roll frozen water bottle under heels prn\par 3)  Pain in shoulders  most c/w RTC tendinopathy.  currently much improved.\par   - shoulder exercises\par   - Celebrex prn\par 4)  Borderline DAVIN:  no si/sx of other CTD and all serologies negative - ?due to RA\par 5)  Recent CXR by PMD revealed a 2.5cm opacity of unclear etiology.  CT revealed benign lesion.\par 6)  Persistently elevated ESR:  unlikely due to RA, as pt well controlled clinically\par   - Per pt, PMD performed w/u which was unremarkable.\par 7)  Pain in knees:  due to OA.  currently improved.\par   - Reiterated importance of exercise\par   - ibuprofen and/or Tylenol prn\par   - wt loss\par   - warm compresses\par   - OTC topical analgesics\par 8)  Pain in lateral left foot:  unclear etiology - ?strain.  no evidence of inflammatory process.  Now resolved.

## 2021-09-02 NOTE — HISTORY OF PRESENT ILLNESS
[Arthralgias] : arthralgias [FreeTextEntry1] : Feeling fine since last visit.  RA remains well controlled overall, though she has occasional episodes of stiffness, which resolve spontaneously.  B/L shoulder pain remains virtually resolved.  B/L knee pain has improved.  Left foot pain has also resolved.  No other new complaints. [Anorexia] : no anorexia [Weight Loss] : no weight loss [Malaise] : no malaise [Fever] : no fever [Chills] : no chills [Fatigue] : no fatigue [Malar Facial Rash] : no malar facial rash [Skin Lesions] : no lesions [Skin Nodules] : no skin nodules [Oral Ulcers] : no oral ulcers [Cough] : no cough [Dry Mouth] : no dry mouth [Dysphagia] : no dysphagia [Shortness of Breath] : no shortness of breath [Chest Pain] : no chest pain [Joint Swelling] : no joint swelling [Joint Warmth] : no joint warmth [Joint Deformity] : no joint deformity [Decreased ROM] : no decreased range of motion [Morning Stiffness] : no morning stiffness [Falls] : no falls [Difficulty Standing] : no difficulty standing [Difficulty Walking] : no difficulty walking [Dyspnea] : no dyspnea [Myalgias] : no myalgias [Muscle Weakness] : no muscle weakness [Muscle Spasms] : no muscle spasms [Muscle Cramping] : no muscle cramping [Visual Changes] : no visual changes [Eye Pain] : no eye pain [Eye Redness] : no eye redness [Dry Eyes] : no dry eyes

## 2021-09-03 LAB
BASOPHILS # BLD AUTO: 0.03 K/UL
BASOPHILS NFR BLD AUTO: 0.5 %
EOSINOPHIL # BLD AUTO: 0.17 K/UL
EOSINOPHIL NFR BLD AUTO: 2.8 %
HCT VFR BLD CALC: 41.6 %
HGB BLD-MCNC: 13 G/DL
IMM GRANULOCYTES NFR BLD AUTO: 0.2 %
LYMPHOCYTES # BLD AUTO: 2.63 K/UL
LYMPHOCYTES NFR BLD AUTO: 43.2 %
MAN DIFF?: NORMAL
MCHC RBC-ENTMCNC: 26.8 PG
MCHC RBC-ENTMCNC: 31.3 GM/DL
MCV RBC AUTO: 85.8 FL
MONOCYTES # BLD AUTO: 0.46 K/UL
MONOCYTES NFR BLD AUTO: 7.6 %
NEUTROPHILS # BLD AUTO: 2.79 K/UL
NEUTROPHILS NFR BLD AUTO: 45.7 %
PLATELET # BLD AUTO: 283 K/UL
RBC # BLD: 4.85 M/UL
RBC # FLD: 15.4 %
WBC # FLD AUTO: 6.09 K/UL

## 2021-09-10 NOTE — ED ADULT NURSE NOTE - NS ED NURSE LEVEL OF CONSCIOUSNESS MENTAL STATUS
x-ray.  Ibuprofen as needed. -     Lore Gallagher MD, Sports Medicine, [facility]      No follow-ups on file. Neeru Grandchild, DO     This document may have been prepared at least partially through the use of voice recognition software. Although effort is taken to assure the accuracy of this document, it is possible that grammatical, syntax,  or spelling errors may occur. Awake/Cooperative/Alert

## 2021-11-29 ENCOUNTER — OUTPATIENT (OUTPATIENT)
Dept: OUTPATIENT SERVICES | Facility: HOSPITAL | Age: 61
LOS: 1 days | End: 2021-11-29
Payer: COMMERCIAL

## 2021-11-29 DIAGNOSIS — Z90.49 ACQUIRED ABSENCE OF OTHER SPECIFIED PARTS OF DIGESTIVE TRACT: Chronic | ICD-10-CM

## 2021-11-29 DIAGNOSIS — Z01.818 ENCOUNTER FOR OTHER PREPROCEDURAL EXAMINATION: ICD-10-CM

## 2021-11-29 LAB
ALBUMIN SERPL ELPH-MCNC: 4.5 G/DL — SIGNIFICANT CHANGE UP (ref 3.3–5.2)
ALP SERPL-CCNC: 66 U/L — SIGNIFICANT CHANGE UP (ref 40–120)
ALT FLD-CCNC: 23 U/L — SIGNIFICANT CHANGE UP
ANION GAP SERPL CALC-SCNC: 13 MMOL/L — SIGNIFICANT CHANGE UP (ref 5–17)
APTT BLD: 33.8 SEC — SIGNIFICANT CHANGE UP (ref 27.5–35.5)
AST SERPL-CCNC: 22 U/L — SIGNIFICANT CHANGE UP
BASOPHILS # BLD AUTO: 0.03 K/UL — SIGNIFICANT CHANGE UP (ref 0–0.2)
BASOPHILS NFR BLD AUTO: 0.5 % — SIGNIFICANT CHANGE UP (ref 0–2)
BILIRUB SERPL-MCNC: 0.4 MG/DL — SIGNIFICANT CHANGE UP (ref 0.4–2)
BUN SERPL-MCNC: 10 MG/DL — SIGNIFICANT CHANGE UP (ref 8–20)
CALCIUM SERPL-MCNC: 9.5 MG/DL — SIGNIFICANT CHANGE UP (ref 8.6–10.2)
CHLORIDE SERPL-SCNC: 100 MMOL/L — SIGNIFICANT CHANGE UP (ref 98–107)
CO2 SERPL-SCNC: 25 MMOL/L — SIGNIFICANT CHANGE UP (ref 22–29)
CREAT SERPL-MCNC: 0.44 MG/DL — LOW (ref 0.5–1.3)
EOSINOPHIL # BLD AUTO: 0.08 K/UL — SIGNIFICANT CHANGE UP (ref 0–0.5)
EOSINOPHIL NFR BLD AUTO: 1.2 % — SIGNIFICANT CHANGE UP (ref 0–6)
GLUCOSE SERPL-MCNC: 103 MG/DL — HIGH (ref 70–99)
HCT VFR BLD CALC: 41.2 % — SIGNIFICANT CHANGE UP (ref 34.5–45)
HGB BLD-MCNC: 13 G/DL — SIGNIFICANT CHANGE UP (ref 11.5–15.5)
IMM GRANULOCYTES NFR BLD AUTO: 0.2 % — SIGNIFICANT CHANGE UP (ref 0–1.5)
INR BLD: 1.2 RATIO — HIGH (ref 0.88–1.16)
LYMPHOCYTES # BLD AUTO: 3.13 K/UL — SIGNIFICANT CHANGE UP (ref 1–3.3)
LYMPHOCYTES # BLD AUTO: 47.5 % — HIGH (ref 13–44)
MCHC RBC-ENTMCNC: 26.2 PG — LOW (ref 27–34)
MCHC RBC-ENTMCNC: 31.6 GM/DL — LOW (ref 32–36)
MCV RBC AUTO: 83.1 FL — SIGNIFICANT CHANGE UP (ref 80–100)
MONOCYTES # BLD AUTO: 0.72 K/UL — SIGNIFICANT CHANGE UP (ref 0–0.9)
MONOCYTES NFR BLD AUTO: 10.9 % — SIGNIFICANT CHANGE UP (ref 2–14)
NEUTROPHILS # BLD AUTO: 2.62 K/UL — SIGNIFICANT CHANGE UP (ref 1.8–7.4)
NEUTROPHILS NFR BLD AUTO: 39.7 % — LOW (ref 43–77)
PLATELET # BLD AUTO: 318 K/UL — SIGNIFICANT CHANGE UP (ref 150–400)
POTASSIUM SERPL-MCNC: 4.5 MMOL/L — SIGNIFICANT CHANGE UP (ref 3.5–5.3)
POTASSIUM SERPL-SCNC: 4.5 MMOL/L — SIGNIFICANT CHANGE UP (ref 3.5–5.3)
PROT SERPL-MCNC: 8.3 G/DL — SIGNIFICANT CHANGE UP (ref 6.6–8.7)
PROTHROM AB SERPL-ACNC: 13.8 SEC — HIGH (ref 10.6–13.6)
RBC # BLD: 4.96 M/UL — SIGNIFICANT CHANGE UP (ref 3.8–5.2)
RBC # FLD: 15.2 % — HIGH (ref 10.3–14.5)
SODIUM SERPL-SCNC: 138 MMOL/L — SIGNIFICANT CHANGE UP (ref 135–145)
WBC # BLD: 6.59 K/UL — SIGNIFICANT CHANGE UP (ref 3.8–10.5)
WBC # FLD AUTO: 6.59 K/UL — SIGNIFICANT CHANGE UP (ref 3.8–10.5)

## 2021-11-29 PROCEDURE — 85025 COMPLETE CBC W/AUTO DIFF WBC: CPT

## 2021-11-29 PROCEDURE — 80053 COMPREHEN METABOLIC PANEL: CPT

## 2021-11-29 PROCEDURE — 93005 ELECTROCARDIOGRAM TRACING: CPT

## 2021-11-29 PROCEDURE — 85730 THROMBOPLASTIN TIME PARTIAL: CPT

## 2021-11-29 PROCEDURE — 85610 PROTHROMBIN TIME: CPT

## 2021-11-29 PROCEDURE — 93010 ELECTROCARDIOGRAM REPORT: CPT

## 2021-11-29 PROCEDURE — G0463: CPT

## 2021-11-29 PROCEDURE — 36415 COLL VENOUS BLD VENIPUNCTURE: CPT

## 2021-12-02 ENCOUNTER — APPOINTMENT (OUTPATIENT)
Dept: RHEUMATOLOGY | Facility: CLINIC | Age: 61
End: 2021-12-02
Payer: COMMERCIAL

## 2021-12-02 VITALS
BODY MASS INDEX: 33.99 KG/M2 | TEMPERATURE: 98.3 F | OXYGEN SATURATION: 98 % | HEIGHT: 61 IN | DIASTOLIC BLOOD PRESSURE: 82 MMHG | HEART RATE: 61 BPM | WEIGHT: 180 LBS | RESPIRATION RATE: 16 BRPM | SYSTOLIC BLOOD PRESSURE: 132 MMHG

## 2021-12-02 PROCEDURE — 99214 OFFICE O/P EST MOD 30 MIN: CPT

## 2021-12-02 NOTE — HISTORY OF PRESENT ILLNESS
[Arthralgias] : arthralgias [FreeTextEntry1] : Pt c/o pain in her B/L knees (L>R), primarily upon getting up after prolonged sitting.  Also w/ mild discomfort in her B/L shoulders primarily with abduction.   No joint swelling / AM stiffness.  No new complaints.  Pt reports she will be undergoing D&C procedure of her uterus next week. [Anorexia] : no anorexia [Weight Loss] : no weight loss [Malaise] : no malaise [Fever] : no fever [Chills] : no chills [Fatigue] : no fatigue [Malar Facial Rash] : no malar facial rash [Skin Lesions] : no lesions [Skin Nodules] : no skin nodules [Oral Ulcers] : no oral ulcers [Cough] : no cough [Dry Mouth] : no dry mouth [Dysphagia] : no dysphagia [Shortness of Breath] : no shortness of breath [Chest Pain] : no chest pain [Joint Swelling] : no joint swelling [Joint Warmth] : no joint warmth [Joint Deformity] : no joint deformity [Decreased ROM] : no decreased range of motion [Morning Stiffness] : no morning stiffness [Falls] : no falls [Difficulty Standing] : no difficulty standing [Difficulty Walking] : no difficulty walking [Dyspnea] : no dyspnea [Myalgias] : no myalgias [Muscle Weakness] : no muscle weakness [Muscle Spasms] : no muscle spasms [Muscle Cramping] : no muscle cramping [Visual Changes] : no visual changes [Eye Pain] : no eye pain [Eye Redness] : no eye redness [Dry Eyes] : no dry eyes

## 2021-12-02 NOTE — PHYSICAL EXAM
[General Appearance - Alert] : alert [General Appearance - In No Acute Distress] : in no acute distress [Sclera] : the sclera and conjunctiva were normal [Outer Ear] : the ears and nose were normal in appearance [Oropharynx] : the oropharynx was normal [Neck Appearance] : the appearance of the neck was normal [Neck Cervical Mass (___cm)] : no neck mass was observed [Jugular Venous Distention Increased] : there was no jugular-venous distention [Thyroid Diffuse Enlargement] : the thyroid was not enlarged [Thyroid Nodule] : there were no palpable thyroid nodules [Auscultation Breath Sounds / Voice Sounds] : lungs were clear to auscultation bilaterally [Heart Rate And Rhythm] : heart rate was normal and rhythm regular [Heart Sounds] : normal S1 and S2 [Heart Sounds Gallop] : no gallops [Murmurs] : no murmurs [Heart Sounds Pericardial Friction Rub] : no pericardial rub [Edema] : there was no peripheral edema [Bowel Sounds] : normal bowel sounds [Abdomen Soft] : soft [Abdomen Tenderness] : non-tender [Abdomen Mass (___ Cm)] : no abdominal mass palpated [Cervical Lymph Nodes Enlarged Posterior Bilaterally] : posterior cervical [Cervical Lymph Nodes Enlarged Anterior Bilaterally] : anterior cervical [Supraclavicular Lymph Nodes Enlarged Bilaterally] : supraclavicular [No Spinal Tenderness] : no spinal tenderness [Skin Color & Pigmentation] : normal skin color and pigmentation [Skin Turgor] : normal skin turgor [] : no rash [No Focal Deficits] : no focal deficits [Oriented To Time, Place, And Person] : oriented to person, place, and time [Impaired Insight] : insight and judgment were intact [Affect] : the affect was normal [FreeTextEntry1] : No synovitis;  normal ROM in all joints

## 2021-12-02 NOTE — ASSESSMENT
[FreeTextEntry1] : 58 year old female with:\par 1)  Long-standing RA (+RF, +CCP), clinically well controlled on MTX.  Also w/ persistently elevated ESR/CRP.\par   - Cont MTX 12.5mg weekly, folic acid 1mg daily.\par   - Hepatitis panel negative 10/18.\par   - Flu vaccine (9/21, per pt), Prevnar (7/19), Pneumovax (12/20) UTD.  Pt has received the COVID19 vaccine + booster.\par 2)  Pain in B/L heels:  most c/w plantar fasciitis - resolved\par   - heel stretching exercises\par   - Celebrex prn\par   - roll frozen water bottle under heels prn\par 3)  Pain in shoulders  most c/w RTC tendinopathy. \par   - shoulder exercises\par   - Celebrex prn\par 4)  Borderline DAVIN:  no si/sx of other CTD and all serologies negative - ?due to RA\par 5)  Recent CXR by PMD revealed a 2.5cm opacity of unclear etiology.  CT revealed benign lesion.\par 6)  Persistently elevated ESR:  unlikely due to RA, as pt well controlled clinically\par   - Per pt, PMD performed w/u which was unremarkable.\par 7)  Pain in knees:  due to OA. \par   - Reiterated importance of exercise\par   - ibuprofen and/or Tylenol prn\par   - wt loss\par   - warm compresses\par   - OTC topical analgesics\par 8)  Pain in lateral left foot:  unclear etiology - ?strain.  no evidence of inflammatory process.  Now resolved.

## 2021-12-10 ENCOUNTER — RESULT REVIEW (OUTPATIENT)
Age: 61
End: 2021-12-10

## 2022-03-03 ENCOUNTER — APPOINTMENT (OUTPATIENT)
Dept: RHEUMATOLOGY | Facility: CLINIC | Age: 62
End: 2022-03-03
Payer: COMMERCIAL

## 2022-03-03 VITALS
DIASTOLIC BLOOD PRESSURE: 84 MMHG | HEIGHT: 61 IN | OXYGEN SATURATION: 98 % | HEART RATE: 59 BPM | BODY MASS INDEX: 33.04 KG/M2 | RESPIRATION RATE: 17 BRPM | SYSTOLIC BLOOD PRESSURE: 138 MMHG | TEMPERATURE: 98 F | WEIGHT: 175 LBS

## 2022-03-03 DIAGNOSIS — R93.89 ABNORMAL FINDINGS ON DIAGNOSTIC IMAGING OF OTHER SPECIFIED BODY STRUCTURES: ICD-10-CM

## 2022-03-03 LAB
ALBUMIN SERPL ELPH-MCNC: 4.6 G/DL
ALP BLD-CCNC: 77 U/L
ALT SERPL-CCNC: 20 U/L
ANION GAP SERPL CALC-SCNC: 13 MMOL/L
AST SERPL-CCNC: 17 U/L
BASOPHILS # BLD AUTO: 0.03 K/UL
BASOPHILS NFR BLD AUTO: 0.5 %
BILIRUB SERPL-MCNC: 0.5 MG/DL
BUN SERPL-MCNC: 8 MG/DL
CALCIUM SERPL-MCNC: 9.5 MG/DL
CHLORIDE SERPL-SCNC: 101 MMOL/L
CO2 SERPL-SCNC: 25 MMOL/L
CREAT SERPL-MCNC: 0.52 MG/DL
CRP SERPL-MCNC: 15 MG/L
EGFR: 106 ML/MIN/1.73M2
EOSINOPHIL # BLD AUTO: 0.09 K/UL
EOSINOPHIL NFR BLD AUTO: 1.5 %
ERYTHROCYTE [SEDIMENTATION RATE] IN BLOOD BY WESTERGREN METHOD: 78 MM/HR
GLUCOSE SERPL-MCNC: 107 MG/DL
HCT VFR BLD CALC: 42.2 %
HGB BLD-MCNC: 13.2 G/DL
IMM GRANULOCYTES NFR BLD AUTO: 0.2 %
LYMPHOCYTES # BLD AUTO: 2.47 K/UL
LYMPHOCYTES NFR BLD AUTO: 41.9 %
MAN DIFF?: NORMAL
MCHC RBC-ENTMCNC: 26.5 PG
MCHC RBC-ENTMCNC: 31.3 GM/DL
MCV RBC AUTO: 84.6 FL
MONOCYTES # BLD AUTO: 0.65 K/UL
MONOCYTES NFR BLD AUTO: 11 %
NEUTROPHILS # BLD AUTO: 2.65 K/UL
NEUTROPHILS NFR BLD AUTO: 44.9 %
PLATELET # BLD AUTO: 326 K/UL
POTASSIUM SERPL-SCNC: 4.8 MMOL/L
PROT SERPL-MCNC: 7.6 G/DL
RBC # BLD: 4.99 M/UL
RBC # FLD: 15.2 %
SODIUM SERPL-SCNC: 139 MMOL/L
WBC # FLD AUTO: 5.9 K/UL

## 2022-03-03 PROCEDURE — 99214 OFFICE O/P EST MOD 30 MIN: CPT | Mod: 25

## 2022-03-03 PROCEDURE — 36415 COLL VENOUS BLD VENIPUNCTURE: CPT

## 2022-03-03 NOTE — HISTORY OF PRESENT ILLNESS
[Arthralgias] : arthralgias [FreeTextEntry1] : Feeling better overall.  B/L knee pain has improved though not resolved.  Also still w/ mild discomfort in her B/L shoulders primarily with abduction.   No joint swelling / AM stiffness.  No new complaints. [Anorexia] : no anorexia [Weight Loss] : no weight loss [Malaise] : no malaise [Fever] : no fever [Chills] : no chills [Fatigue] : no fatigue [Malar Facial Rash] : no malar facial rash [Skin Lesions] : no lesions [Skin Nodules] : no skin nodules [Oral Ulcers] : no oral ulcers [Cough] : no cough [Dry Mouth] : no dry mouth [Dysphagia] : no dysphagia [Shortness of Breath] : no shortness of breath [Chest Pain] : no chest pain [Joint Swelling] : no joint swelling [Joint Warmth] : no joint warmth [Joint Deformity] : no joint deformity [Decreased ROM] : no decreased range of motion [Morning Stiffness] : no morning stiffness [Falls] : no falls [Difficulty Standing] : no difficulty standing [Difficulty Walking] : no difficulty walking [Dyspnea] : no dyspnea [Myalgias] : no myalgias [Muscle Weakness] : no muscle weakness [Muscle Spasms] : no muscle spasms [Muscle Cramping] : no muscle cramping [Visual Changes] : no visual changes [Eye Pain] : no eye pain [Eye Redness] : no eye redness [Dry Eyes] : no dry eyes

## 2022-03-08 ENCOUNTER — APPOINTMENT (OUTPATIENT)
Dept: UROLOGY | Facility: CLINIC | Age: 62
End: 2022-03-08
Payer: COMMERCIAL

## 2022-03-08 ENCOUNTER — OUTPATIENT (OUTPATIENT)
Dept: OUTPATIENT SERVICES | Facility: HOSPITAL | Age: 62
LOS: 1 days | End: 2022-03-08
Payer: COMMERCIAL

## 2022-03-08 DIAGNOSIS — Z90.49 ACQUIRED ABSENCE OF OTHER SPECIFIED PARTS OF DIGESTIVE TRACT: Chronic | ICD-10-CM

## 2022-03-08 DIAGNOSIS — R35.0 FREQUENCY OF MICTURITION: ICD-10-CM

## 2022-03-08 PROCEDURE — 76775 US EXAM ABDO BACK WALL LIM: CPT | Mod: 26

## 2022-03-08 PROCEDURE — 76775 US EXAM ABDO BACK WALL LIM: CPT

## 2022-03-08 PROCEDURE — 99212 OFFICE O/P EST SF 10 MIN: CPT

## 2022-03-08 NOTE — HISTORY OF PRESENT ILLNESS
[FreeTextEntry1] : Patient here for evaluation of left UPJ issues.\par End of August noted dysuria and pain after urination for a few days; then she had left flank pain with a fever to 101 with no N/V or chills. Never had gross hematuria. Admitted to [facility]. HAd Non-contrast CT scan noting left hydronephrosis to UPJ and no stones. She had renal scan a few days later noting quick bilateral uptake, 44 V 56% function and T1/2 washout 24 minutes. She was treated with IV antibiotics and resolved quickly. \par no prior h/o left flank pain or UTIs. GRF >60.\par \par reviewed films online: see ULS 2/20 which noted a left parapelvic cyst - i think seeing the renal pelvis\par reviewed CT scan with patient:; good parenchyma - like right and noted the MAG3 scan. \par \par 3/22 no further flank pains or UTI\par ULS today - extrarenal pelvic with NO calyceal dilatation

## 2022-03-09 DIAGNOSIS — N13.5 CROSSING VESSEL AND STRICTURE OF URETER WITHOUT HYDRONEPHROSIS: ICD-10-CM

## 2022-03-11 LAB — BACTERIA UR CULT: NORMAL

## 2022-06-21 ENCOUNTER — APPOINTMENT (OUTPATIENT)
Dept: RHEUMATOLOGY | Facility: CLINIC | Age: 62
End: 2022-06-21
Payer: COMMERCIAL

## 2022-06-21 VITALS
RESPIRATION RATE: 17 BRPM | HEART RATE: 66 BPM | OXYGEN SATURATION: 97 % | DIASTOLIC BLOOD PRESSURE: 76 MMHG | SYSTOLIC BLOOD PRESSURE: 110 MMHG | TEMPERATURE: 98 F

## 2022-06-21 LAB
ALBUMIN SERPL ELPH-MCNC: 4.2 G/DL
ALP BLD-CCNC: 73 U/L
ALT SERPL-CCNC: 13 U/L
ANION GAP SERPL CALC-SCNC: 12 MMOL/L
AST SERPL-CCNC: 16 U/L
BASOPHILS # BLD AUTO: 0.03 K/UL
BASOPHILS NFR BLD AUTO: 0.5 %
BILIRUB SERPL-MCNC: 0.3 MG/DL
BUN SERPL-MCNC: 13 MG/DL
CALCIUM SERPL-MCNC: 9.4 MG/DL
CHLORIDE SERPL-SCNC: 103 MMOL/L
CO2 SERPL-SCNC: 24 MMOL/L
CREAT SERPL-MCNC: 0.51 MG/DL
CRP SERPL-MCNC: 11 MG/L
EGFR: 107 ML/MIN/1.73M2
EOSINOPHIL # BLD AUTO: 0.15 K/UL
EOSINOPHIL NFR BLD AUTO: 2.5 %
GLUCOSE SERPL-MCNC: 163 MG/DL
HCT VFR BLD CALC: 40.7 %
HGB BLD-MCNC: 12.7 G/DL
IMM GRANULOCYTES NFR BLD AUTO: 0.2 %
LYMPHOCYTES # BLD AUTO: 2.16 K/UL
LYMPHOCYTES NFR BLD AUTO: 36.1 %
MAN DIFF?: NORMAL
MCHC RBC-ENTMCNC: 26.1 PG
MCHC RBC-ENTMCNC: 31.2 GM/DL
MCV RBC AUTO: 83.7 FL
MONOCYTES # BLD AUTO: 0.52 K/UL
MONOCYTES NFR BLD AUTO: 8.7 %
NEUTROPHILS # BLD AUTO: 3.11 K/UL
NEUTROPHILS NFR BLD AUTO: 52 %
PLATELET # BLD AUTO: 285 K/UL
POTASSIUM SERPL-SCNC: 4 MMOL/L
PROT SERPL-MCNC: 7.2 G/DL
RBC # BLD: 4.86 M/UL
RBC # FLD: 15.5 %
SODIUM SERPL-SCNC: 139 MMOL/L
WBC # FLD AUTO: 5.98 K/UL

## 2022-06-21 PROCEDURE — 99214 OFFICE O/P EST MOD 30 MIN: CPT | Mod: 25

## 2022-06-21 PROCEDURE — 36415 COLL VENOUS BLD VENIPUNCTURE: CPT

## 2022-06-21 NOTE — HISTORY OF PRESENT ILLNESS
[Arthralgias] : arthralgias [FreeTextEntry1] : Continues to feel better overall.  B/L knee pain has  resolved.  B/L shoulder discomfort also has virtually resolved.   No joint pain/ swelling / AM stiffness.  No new complaints. [Anorexia] : no anorexia [Weight Loss] : no weight loss [Malaise] : no malaise [Fever] : no fever [Chills] : no chills [Fatigue] : no fatigue [Malar Facial Rash] : no malar facial rash [Skin Lesions] : no lesions [Skin Nodules] : no skin nodules [Oral Ulcers] : no oral ulcers [Cough] : no cough [Dry Mouth] : no dry mouth [Dysphagia] : no dysphagia [Shortness of Breath] : no shortness of breath [Chest Pain] : no chest pain [Joint Swelling] : no joint swelling [Joint Warmth] : no joint warmth [Joint Deformity] : no joint deformity [Decreased ROM] : no decreased range of motion [Morning Stiffness] : no morning stiffness [Falls] : no falls [Difficulty Standing] : no difficulty standing [Difficulty Walking] : no difficulty walking [Dyspnea] : no dyspnea [Myalgias] : no myalgias [Muscle Weakness] : no muscle weakness [Muscle Spasms] : no muscle spasms [Muscle Cramping] : no muscle cramping [Visual Changes] : no visual changes [Eye Pain] : no eye pain [Eye Redness] : no eye redness [Dry Eyes] : no dry eyes

## 2022-06-21 NOTE — ASSESSMENT
[FreeTextEntry1] : 58 year old female with:\par 1)  Long-standing RA (+RF, +CCP), clinically well controlled on MTX.  Also w/ persistently elevated ESR/CRP.\par   - Cont MTX 10mg weekly, cont folic acid 1mg daily.\par   - Hepatitis panel negative 10/18.\par   - Flu vaccine (9/21, per pt), Prevnar (7/19), Pneumovax (12/20) UTD.  Pt has received the COVID19 vaccine + booster x 2.\par   - Check labs.\par 2)  Pain in B/L heels:  most c/w plantar fasciitis - resolved\par   - heel stretching exercises\par   - ibuprofen prn\par   - roll frozen water bottle under heels prn\par 3)  Pain in shoulders  most c/w RTC tendinopathy.  virtually resolved.\par   - shoulder exercises\par   - ibuprofen prn\par 4)  Borderline DAVIN:  no si/sx of other CTD and all serologies negative - ?due to RA\par 5)  Recent CXR by PMD revealed a 2.5cm opacity of unclear etiology.  CT revealed benign lesion.\par 6)  Persistently elevated ESR:  unlikely due to RA, as pt well controlled clinically\par   - Per pt, PMD performed w/u which was unremarkable.\par 7)  Pain in knees:  due to OA.  now resolved.\par   - Reiterated importance of exercise\par   - ibuprofen and/or Tylenol prn\par   - wt loss\par   - warm compresses\par   - OTC topical analgesics\par 8)  Pain in lateral left foot:  unclear etiology - ?strain.  no evidence of inflammatory process.  Now resolved.

## 2022-06-22 LAB — ERYTHROCYTE [SEDIMENTATION RATE] IN BLOOD BY WESTERGREN METHOD: 105 MM/HR

## 2022-08-16 ENCOUNTER — RX RENEWAL (OUTPATIENT)
Age: 62
End: 2022-08-16

## 2022-09-29 ENCOUNTER — APPOINTMENT (OUTPATIENT)
Dept: RHEUMATOLOGY | Facility: CLINIC | Age: 62
End: 2022-09-29

## 2022-09-29 VITALS
TEMPERATURE: 97.7 F | RESPIRATION RATE: 17 BRPM | SYSTOLIC BLOOD PRESSURE: 118 MMHG | HEIGHT: 61 IN | OXYGEN SATURATION: 97 % | DIASTOLIC BLOOD PRESSURE: 84 MMHG | HEART RATE: 70 BPM

## 2022-09-29 DIAGNOSIS — Z23 ENCOUNTER FOR IMMUNIZATION: ICD-10-CM

## 2022-09-29 LAB
ALBUMIN SERPL ELPH-MCNC: 4.4 G/DL
ALP BLD-CCNC: 74 U/L
ALT SERPL-CCNC: 16 U/L
ANION GAP SERPL CALC-SCNC: 11 MMOL/L
AST SERPL-CCNC: 15 U/L
BASOPHILS # BLD AUTO: 0.04 K/UL
BASOPHILS NFR BLD AUTO: 0.6 %
BILIRUB SERPL-MCNC: 0.3 MG/DL
BUN SERPL-MCNC: 12 MG/DL
CALCIUM SERPL-MCNC: 9.3 MG/DL
CHLORIDE SERPL-SCNC: 103 MMOL/L
CO2 SERPL-SCNC: 27 MMOL/L
CREAT SERPL-MCNC: 0.47 MG/DL
CRP SERPL-MCNC: 15 MG/L
EGFR: 109 ML/MIN/1.73M2
EOSINOPHIL # BLD AUTO: 0.14 K/UL
EOSINOPHIL NFR BLD AUTO: 2 %
ERYTHROCYTE [SEDIMENTATION RATE] IN BLOOD BY WESTERGREN METHOD: 93 MM/HR
GLUCOSE SERPL-MCNC: 105 MG/DL
HCT VFR BLD CALC: 42.6 %
HGB BLD-MCNC: 13.2 G/DL
IMM GRANULOCYTES NFR BLD AUTO: 0.1 %
LYMPHOCYTES # BLD AUTO: 2.46 K/UL
LYMPHOCYTES NFR BLD AUTO: 35 %
MAN DIFF?: NORMAL
MCHC RBC-ENTMCNC: 26.5 PG
MCHC RBC-ENTMCNC: 31 GM/DL
MCV RBC AUTO: 85.4 FL
MONOCYTES # BLD AUTO: 0.71 K/UL
MONOCYTES NFR BLD AUTO: 10.1 %
NEUTROPHILS # BLD AUTO: 3.66 K/UL
NEUTROPHILS NFR BLD AUTO: 52.2 %
PLATELET # BLD AUTO: 320 K/UL
POTASSIUM SERPL-SCNC: 4.4 MMOL/L
PROT SERPL-MCNC: 7.4 G/DL
RBC # BLD: 4.99 M/UL
RBC # FLD: 15.1 %
SODIUM SERPL-SCNC: 141 MMOL/L
WBC # FLD AUTO: 7.02 K/UL

## 2022-09-29 PROCEDURE — 90471 IMMUNIZATION ADMIN: CPT

## 2022-09-29 PROCEDURE — 99214 OFFICE O/P EST MOD 30 MIN: CPT | Mod: 25

## 2022-09-29 PROCEDURE — 90686 IIV4 VACC NO PRSV 0.5 ML IM: CPT

## 2022-09-29 PROCEDURE — 36415 COLL VENOUS BLD VENIPUNCTURE: CPT

## 2022-09-29 NOTE — HISTORY OF PRESENT ILLNESS
[Arthralgias] : arthralgias [FreeTextEntry1] : Pt reports that she experienced several RA flares in her hands and hips.  She increased MTX back to 12.5mg weekly and the joint pains have improved though not resolved.  No other complaints.\par  [Anorexia] : no anorexia [Weight Loss] : no weight loss [Malaise] : no malaise [Fever] : no fever [Chills] : no chills [Fatigue] : no fatigue [Malar Facial Rash] : no malar facial rash [Skin Lesions] : no lesions [Skin Nodules] : no skin nodules [Oral Ulcers] : no oral ulcers [Cough] : no cough [Dry Mouth] : no dry mouth [Dysphagia] : no dysphagia [Shortness of Breath] : no shortness of breath [Chest Pain] : no chest pain [Joint Swelling] : no joint swelling [Joint Warmth] : no joint warmth [Joint Deformity] : no joint deformity [Decreased ROM] : no decreased range of motion [Morning Stiffness] : no morning stiffness [Falls] : no falls [Difficulty Standing] : no difficulty standing [Difficulty Walking] : no difficulty walking [Dyspnea] : no dyspnea [Myalgias] : no myalgias [Muscle Weakness] : no muscle weakness [Muscle Spasms] : no muscle spasms [Muscle Cramping] : no muscle cramping [Visual Changes] : no visual changes [Eye Pain] : no eye pain [Eye Redness] : no eye redness [Dry Eyes] : no dry eyes

## 2022-09-29 NOTE — ASSESSMENT
[FreeTextEntry1] : 58 year old female with:\par 1)  Long-standing RA (+RF, +CCP), had been clinically well controlled on MTX 10mg weekly, but now w/ persistent joint pain despite increasing it to 12.5mg weekly.  Also w/ persistently elevated ESR/CRP.\par   - Cont MTX 12.5mg weekly for 1 more month.  If no improvement, pt too increase it to 15mg weekly.  Cont folic acid 1mg daily.\par   - Hepatitis panel negative 10/18.\par   - Administered flu vaccine.  Prevnar (7/19), Pneumovax (12/20) UTD.  Pt has received the COVID19 vaccine + booster x 2.  Recommended that she receive the bivalent booster.  Advised her to hold MTX for 1 week after receiving it, as per ACR recommendations. \par   - Check labs.\par 2)  Pain in B/L heels:  most c/w plantar fasciitis - resolved\par   - heel stretching exercises\par   - ibuprofen prn\par   - roll frozen water bottle under heels prn\par 3)  Pain in shoulders  most c/w RTC tendinopathy.  currently symptomatic on right only.\par   - shoulder exercises\par   - ibuprofen prn\par 4)  Borderline DAVIN:  no si/sx of other CTD and all serologies negative - ?due to RA\par 5)  Recent CXR by PMD revealed a 2.5cm opacity of unclear etiology.  CT revealed benign lesion.\par 6)  Persistently elevated ESR:  unlikely due to RA, as pt well controlled clinically\par   - Per pt, PMD performed w/u which was unremarkable.\par 7)  Pain in knees:  due to OA.  now resolved.\par   - Reiterated importance of exercise\par   - ibuprofen and/or Tylenol prn\par   - wt loss\par   - warm compresses\par   - OTC topical analgesics\par 8)  Pain in lateral left foot:  unclear etiology - ?strain.  no evidence of inflammatory process.  Now resolved.

## 2022-10-13 ENCOUNTER — EMERGENCY (EMERGENCY)
Facility: HOSPITAL | Age: 62
LOS: 1 days | Discharge: HOME | End: 2022-10-13
Attending: EMERGENCY MEDICINE
Payer: COMMERCIAL

## 2022-10-13 VITALS
OXYGEN SATURATION: 98 % | HEART RATE: 69 BPM | HEIGHT: 61 IN | DIASTOLIC BLOOD PRESSURE: 87 MMHG | TEMPERATURE: 98 F | WEIGHT: 169.98 LBS | SYSTOLIC BLOOD PRESSURE: 150 MMHG | RESPIRATION RATE: 18 BRPM

## 2022-10-13 DIAGNOSIS — Z90.49 ACQUIRED ABSENCE OF OTHER SPECIFIED PARTS OF DIGESTIVE TRACT: Chronic | ICD-10-CM

## 2022-10-13 PROCEDURE — 73610 X-RAY EXAM OF ANKLE: CPT | Mod: 26,RT

## 2022-10-13 PROCEDURE — 99284 EMERGENCY DEPT VISIT MOD MDM: CPT

## 2022-10-13 PROCEDURE — 73110 X-RAY EXAM OF WRIST: CPT | Mod: 26,LT

## 2022-10-13 PROCEDURE — 73110 X-RAY EXAM OF WRIST: CPT

## 2022-10-13 PROCEDURE — 73610 X-RAY EXAM OF ANKLE: CPT

## 2022-10-13 RX ORDER — ACETAMINOPHEN 500 MG
650 TABLET ORAL ONCE
Refills: 0 | Status: DISCONTINUED | OUTPATIENT
Start: 2022-10-13 | End: 2022-10-13

## 2022-10-13 NOTE — ED PROVIDER NOTE - PATIENT PORTAL LINK FT
You can access the FollowMyHealth Patient Portal offered by [facility] by registering at the following website: [email or web address]. By joining [facility] FollowMyHealth portal, you will also be able to view your health information using other applications (apps) compatible with our system.

## 2022-10-13 NOTE — ED PROVIDER NOTE - NS ED ROS FT
Gen: denies fever, chills, fatigue, weight loss  Skin: denies rashes, laceration, bruising  HEENT: denies visual changes, ear pain, nasal congestion, throat pain  Respiratory: denies FERMIN, SOB, cough, wheezing  Cardiovascular: denies chest pain, palpitations  GI: denies abdominal pain, n/v/d  : denies dysuria, frequency, urgency, bowel/bladder incontinence  MSK: Admits right ankle pain with swelling. Admits left wrist pain without obvious deformity.  denies joint , back pain, neck pain  Neuro: denies headache, dizziness, weakness, numbness

## 2022-10-13 NOTE — ED PROVIDER NOTE - NSFOLLOWUPINSTRUCTIONS_ED_ALL_ED_FT
- follow up with orthopedics within one week  - Follow up with primary care  - return for new or worsening symptoms     Sprain    A sprain is a stretch or tear in one of the tough, fiber-like tissues (ligaments) in your body. This is caused by an injury to the area such as a twisting mechanism. Symptoms include pain, swelling, or bruising. Rest that area over the next several days and slowly resume activity when tolerated. Ice can help with swelling and pain.     SEEK IMMEDIATE MEDICAL CARE IF YOU HAVE ANY OF THE FOLLOWING SYMPTOMS: worsening pain, inability to move that body part, numbness or tingling.

## 2022-10-13 NOTE — ED PROVIDER NOTE - ATTENDING APP SHARED VISIT CONTRIBUTION OF CARE
s/p fall  pain wrist and ankle  pe as doc  med hx RA  xrays ng  ace wrap wrist and air cast ankle  ortho fu  agree w care

## 2022-10-13 NOTE — ED PROVIDER NOTE - CARE PROVIDER_API CALL
Sampson Begum)  Orthopaedic Surgery  [address] [place] [address]  Phone: [phone]  Fax: [phone]  Follow Up Time:

## 2022-10-13 NOTE — ED PROVIDER NOTE - OBJECTIVE STATEMENT
62 year old female PMHx RA, presents with right ankle pain x 4 hours. pt reports falling down three steps landing on right ankle and on left wrist. Pt reports ability to bear weight but difficulty walking now and immediately after accident. Pt reports swelling, pain with range of motion. Pt denies hitting head, LOC, knee pain, hip pain, chest pain, SOB.

## 2022-10-13 NOTE — ED PROVIDER NOTE - PROGRESS NOTE DETAILS
right ankle air cast  Pt reassessed, pt feeling better at this time, vss, pt able to walk, talk and vocalized plan of action. Discussed in depth and explained to pt in depth the next steps that need to be taken including proper follow up with PCP or specialists. All incidental findings were discussed with pt as well. Pt verbalized their concerns and all questions were answered. Pt understands dispo and wants discharge. Given good instructions when to return to ED, strict return precautions and importance of f/u.

## 2022-10-13 NOTE — ED PROVIDER NOTE - NS ED ATTENDING STATEMENT MOD
This was a shared visit with the TASHA. I reviewed and verified the documentation and independently performed the documented:

## 2022-10-13 NOTE — ED PROVIDER NOTE - PHYSICAL EXAMINATION
Gen: No acute distress, non toxic  HEENT: Mucous membranes moist, pink conjunctivae, EOMI. PERRL. Airway patent.   CV: RRR, nl s1/s2.  Resp: CTAB, normal rate and effort. No wheezes, rhonchi, or crackles.   GI: Abdomen soft, NT, ND. No rebound, no guarding  Neuro: A&O x4, MAEx4. 5/5 str ext x 4. Sensation intact, symmetric throughout. No FND's. Gait intact. CN 2-12 intact.   MSK: +right lateral posteior mallelous TTP, with swellling. +distal ulna TTP without swelling. No midline spinal ttp. No visualized or palpable deformities.  Skin: No rashes, skin intact and well perfused. Cap refill <2sec  Vascular: Radial and dorsalis pedal pulses 2+ b/l

## 2022-10-19 ENCOUNTER — APPOINTMENT (OUTPATIENT)
Age: 62
End: 2022-10-19

## 2022-10-19 VITALS
BODY MASS INDEX: 33.04 KG/M2 | DIASTOLIC BLOOD PRESSURE: 81 MMHG | WEIGHT: 175 LBS | SYSTOLIC BLOOD PRESSURE: 130 MMHG | HEIGHT: 61 IN | HEART RATE: 65 BPM

## 2022-10-19 DIAGNOSIS — M25.531 PAIN IN RIGHT WRIST: ICD-10-CM

## 2022-10-19 DIAGNOSIS — S52.515A NONDISPLACED FRACTURE OF LEFT RADIAL STYLOID PROCESS, INITIAL ENCOUNTER FOR CLOSED FRACTURE: ICD-10-CM

## 2022-10-19 PROCEDURE — 99203 OFFICE O/P NEW LOW 30 MIN: CPT | Mod: 25

## 2022-10-19 PROCEDURE — 25600 CLTX DST RDL FX/EPHYS SEP WO: CPT | Mod: LT

## 2022-10-19 PROCEDURE — 73110 X-RAY EXAM OF WRIST: CPT | Mod: RT

## 2022-10-19 NOTE — DISCUSSION/SUMMARY
[de-identified] : 62-year-old female with left radial styloid nondisplaced fracture, right wrist sprain and right ankle sprain.  For the radial styloid, no intervention is needed.  It is nondisplaced and will likely heal well with conservative management.  Conservative measures of treatment include rest until asymptomatic, activity avoidance, NSAID's PRN, acetaminophen, application to ice to the area 2-3x daily for 20 minutes, with gradual return to activities.\par \par The patient presents with an acute inversion injury to the right ankle with an injury to the anterior lateral ligamentous complex. I outlined a treatment protocol that will require relative rest over the next 2 weeks. In addition, I discussed the spectrum of sprain injuries; the majority of which responded well to nonoperative modalities of treatment, and the possible indication for surgical management if the ankle becomes chronically and grossly unstable. Nonoperative modalities of treatment include relative rest over the next 2 weeks, NSAID's, ice, compressive wraps and gradual return to weight bearing.\par \par If they are still symptomatic in a few weeks, they may return for repeat evaluation to determine whether lace-up ankle bracing is required for return to activity versus formal physical therapy for balance/proprioceptive training if the patient is still struggling with their injury. At this time, radiographs show no evidence of fracture, and I have recommended weightbearing as tolerated. We'll see the patient back as needed to determine further treatment modalities if needed.\par \par \par As long as she continues to improve, she may follow-up on a as needed basis.\par \par Sampson Begum MD\par Orthopaedic Trauma Surgeon\par [facility]\par [facility] [place]\par Director Orthopaedic Trauma, [facility]\par \par \par \par

## 2022-10-19 NOTE — REASON FOR VISIT
[Initial Visit] : an initial visit for [FreeTextEntry2] : right ankle left wrist injured 10/13/22. pt c/o pain bilateral wrist pain

## 2022-10-19 NOTE — HISTORY OF PRESENT ILLNESS
[de-identified] : Patient is a 62-year-old female presents today for evaluation of bilateral wrist pain as well as right ankle pain.  She states she suffered a fall down a couple of stairs.  She was seen in the ER.  X-rays were obtained.  No x-ray of the right wrist was obtained in the ER.  She states the pain has been improving but primarily has pain over the left radial styloid.  The patient states the pain is made worse with activity and relieved with rest.  Aching, 3 out of 10. [Bending] : worsened by bending [Lifting] : worsened by lifting [Weight Bearing] : worsened by weight bearing [Recumbency] : relieved by recumbency [Rest] : relieved by rest

## 2022-10-19 NOTE — PHYSICAL EXAM
[de-identified] : Physical Exam:\par General: Well appearing, no acute distress, A&O\par Neurologic: A&Ox3, No focal deficits\par Head: NCAT without abrasions, lacerations, or ecchymosis to head, face, or scalp\par Respiratory: Equal chest wall expansion bilaterally, no accessory muscle use\par Lymphatic: No lymphadenopathy palpated\par Skin: Warm and dry\par Psychiatric: Normal mood and affect\par \par LUE:\par SILT r/m/u\par Fires AIN/PIN/ulnar\par 2+ radial pulse\par brisk capillary refill\par Positive tenderness to palpation over the radial styloid\par \par RUE:\par SILT r/m/u\par Fires AIN/PIN/ulnar\par 2+ radial pulse\par brisk capillary refill\par Mild tenderness to palpation over the distal radius\par \par RLE:\par SILT s/s/sp/dp/t\par Fires EHL/FHL/GS/TA\par 2+ DP/PT pulse\par brisk capillary refill\par Positive tenderness to palpation over the anterior lateral ankle [de-identified] : Three-view plain films of the right wrist were obtained today and previous films of the left wrist and right ankle were reviewed.  There is a nondisplaced left radial styloid.  The remaining films show no acute fracture, subluxation or dislocation.

## 2022-12-27 ENCOUNTER — APPOINTMENT (OUTPATIENT)
Dept: RHEUMATOLOGY | Facility: CLINIC | Age: 62
End: 2022-12-27
Payer: COMMERCIAL

## 2022-12-27 VITALS
OXYGEN SATURATION: 97 % | WEIGHT: 175 LBS | SYSTOLIC BLOOD PRESSURE: 120 MMHG | TEMPERATURE: 98 F | HEIGHT: 61 IN | DIASTOLIC BLOOD PRESSURE: 88 MMHG | RESPIRATION RATE: 17 BRPM | HEART RATE: 64 BPM | BODY MASS INDEX: 33.04 KG/M2

## 2022-12-27 LAB
ALBUMIN SERPL ELPH-MCNC: 4.4 G/DL
ALP BLD-CCNC: 68 U/L
ALT SERPL-CCNC: 18 U/L
ANION GAP SERPL CALC-SCNC: 10 MMOL/L
AST SERPL-CCNC: 21 U/L
BASOPHILS # BLD AUTO: 0.03 K/UL
BASOPHILS NFR BLD AUTO: 0.5 %
BILIRUB SERPL-MCNC: 0.3 MG/DL
BUN SERPL-MCNC: 11 MG/DL
CALCIUM SERPL-MCNC: 9.8 MG/DL
CHLORIDE SERPL-SCNC: 100 MMOL/L
CO2 SERPL-SCNC: 28 MMOL/L
CREAT SERPL-MCNC: 0.52 MG/DL
CRP SERPL-MCNC: 8 MG/L
EGFR: 106 ML/MIN/1.73M2
EOSINOPHIL # BLD AUTO: 0.1 K/UL
EOSINOPHIL NFR BLD AUTO: 1.6 %
GLUCOSE SERPL-MCNC: 101 MG/DL
HCT VFR BLD CALC: 41.1 %
HGB BLD-MCNC: 12.9 G/DL
IMM GRANULOCYTES NFR BLD AUTO: 0.3 %
LYMPHOCYTES # BLD AUTO: 2.78 K/UL
LYMPHOCYTES NFR BLD AUTO: 44.3 %
MAN DIFF?: NORMAL
MCHC RBC-ENTMCNC: 26.7 PG
MCHC RBC-ENTMCNC: 31.4 GM/DL
MCV RBC AUTO: 85.1 FL
MONOCYTES # BLD AUTO: 0.63 K/UL
MONOCYTES NFR BLD AUTO: 10 %
NEUTROPHILS # BLD AUTO: 2.72 K/UL
NEUTROPHILS NFR BLD AUTO: 43.3 %
PLATELET # BLD AUTO: 289 K/UL
POTASSIUM SERPL-SCNC: 4.6 MMOL/L
PROT SERPL-MCNC: 7.5 G/DL
RBC # BLD: 4.83 M/UL
RBC # FLD: 15.2 %
SODIUM SERPL-SCNC: 138 MMOL/L
WBC # FLD AUTO: 6.28 K/UL

## 2022-12-27 PROCEDURE — 36415 COLL VENOUS BLD VENIPUNCTURE: CPT

## 2022-12-27 PROCEDURE — 99214 OFFICE O/P EST MOD 30 MIN: CPT | Mod: 25

## 2022-12-27 RX ORDER — METHOTREXATE 2.5 MG/1
2.5 TABLET ORAL
Qty: 72 | Refills: 1 | Status: DISCONTINUED | COMMUNITY
Start: 2018-10-11 | End: 2022-12-27

## 2022-12-27 RX ORDER — BENZONATATE 200 MG/1
200 CAPSULE ORAL
Qty: 15 | Refills: 0 | Status: COMPLETED | COMMUNITY
Start: 2022-10-13

## 2022-12-27 NOTE — ASSESSMENT
[FreeTextEntry1] : 62 year old female with:\par 1)  Long-standing RA (+RF, +CCP), clinically well controlled on MTX 12.5mg weekly.  Also w/ persistently elevated ESR/CRP.\par   - Cont MTX 12.5mg weekly, folic acid 1mg daily.\par   - Hepatitis panel negative 10/18.\par   - Flu vaccine (9/22).  Prevnar (7/19), Pneumovax (12/20) UTD.  Pt has received the COVID19 vaccine + booster x 2+ bivalent booster. \par   - Check labs.\par 2)  Pain in B/L heels:  most c/w plantar fasciitis - resolved\par   - heel stretching exercises\par   - ibuprofen prn\par   - roll frozen water bottle under heels prn\par 3)  Pain in shoulders  most c/w RTC tendinopathy.  resolved.\par   - shoulder exercises\par   - ibuprofen prn\par 4)  Borderline DAVIN:  no si/sx of other CTD and all serologies negative - ?due to RA\par 5)  Recent CXR by PMD revealed a 2.5cm opacity of unclear etiology.  CT revealed benign lesion.\par 6)  Persistently elevated ESR:  unlikely due to RA, as pt well controlled clinically\par   - Per pt, PMD performed w/u which was unremarkable.\par 7)  Pain in knees:  due to OA.  currently w/ pain in left knee only.\par   - Reiterated importance of exercise\par   - ibuprofen and/or Tylenol prn\par   - wt loss\par   - warm compresses\par   - OTC topical analgesics\par 8)  Pain in lateral left foot:  unclear etiology - ?strain.  no evidence of inflammatory process.  Now resolved.

## 2022-12-27 NOTE — HISTORY OF PRESENT ILLNESS
[Arthralgias] : arthralgias [FreeTextEntry1] : Feeling fine since increasing MTX to 12.5mg weekly.  No further flares.  No joint pain, swelling, or AM stiffness.  Only complaint is occasional "bad days" though no specific triggers.  No new complaints.\par  [Anorexia] : no anorexia [Weight Loss] : no weight loss [Malaise] : no malaise [Fever] : no fever [Chills] : no chills [Fatigue] : no fatigue [Malar Facial Rash] : no malar facial rash [Skin Lesions] : no lesions [Skin Nodules] : no skin nodules [Oral Ulcers] : no oral ulcers [Cough] : no cough [Dry Mouth] : no dry mouth [Dysphagia] : no dysphagia [Shortness of Breath] : no shortness of breath [Chest Pain] : no chest pain [Joint Swelling] : no joint swelling [Joint Warmth] : no joint warmth [Joint Deformity] : no joint deformity [Decreased ROM] : no decreased range of motion [Morning Stiffness] : no morning stiffness [Falls] : no falls [Difficulty Standing] : no difficulty standing [Difficulty Walking] : no difficulty walking [Dyspnea] : no dyspnea [Myalgias] : no myalgias [Muscle Weakness] : no muscle weakness [Muscle Spasms] : no muscle spasms [Muscle Cramping] : no muscle cramping [Visual Changes] : no visual changes [Eye Pain] : no eye pain [Eye Redness] : no eye redness [Dry Eyes] : no dry eyes

## 2022-12-28 LAB
DEPRECATED KAPPA LC FREE/LAMBDA SER: 1.42 RATIO
ERYTHROCYTE [SEDIMENTATION RATE] IN BLOOD BY WESTERGREN METHOD: 120 MM/HR
IGA SER QL IEP: 319 MG/DL
IGG SER QL IEP: 1543 MG/DL
IGM SER QL IEP: 126 MG/DL
KAPPA LC CSF-MCNC: 1.55 MG/DL
KAPPA LC SERPL-MCNC: 2.2 MG/DL

## 2023-01-04 LAB
ALBUMIN MFR SERPL ELPH: 51.6 %
ALBUMIN SERPL-MCNC: 3.9 G/DL
ALBUMIN/GLOB SERPL: 1.1 RATIO
ALPHA1 GLOB MFR SERPL ELPH: 4.2 %
ALPHA1 GLOB SERPL ELPH-MCNC: 0.3 G/DL
ALPHA2 GLOB MFR SERPL ELPH: 11.7 %
ALPHA2 GLOB SERPL ELPH-MCNC: 0.9 G/DL
B-GLOBULIN MFR SERPL ELPH: 13 %
B-GLOBULIN SERPL ELPH-MCNC: 1 G/DL
GAMMA GLOB FLD ELPH-MCNC: 1.5 G/DL
GAMMA GLOB MFR SERPL ELPH: 19.5 %
INTERPRETATION SERPL IEP-IMP: NORMAL
M PROTEIN SPEC IFE-MCNC: NORMAL
PROT SERPL-MCNC: 7.5 G/DL
PROT SERPL-MCNC: 7.5 G/DL

## 2023-02-10 ENCOUNTER — OUTPATIENT (OUTPATIENT)
Dept: OUTPATIENT SERVICES | Facility: HOSPITAL | Age: 63
LOS: 1 days | End: 2023-02-10
Payer: COMMERCIAL

## 2023-02-10 ENCOUNTER — APPOINTMENT (OUTPATIENT)
Dept: RADIOLOGY | Facility: CLINIC | Age: 63
End: 2023-02-10
Payer: COMMERCIAL

## 2023-02-10 ENCOUNTER — RESULT REVIEW (OUTPATIENT)
Age: 63
End: 2023-02-10

## 2023-02-10 DIAGNOSIS — Z90.49 ACQUIRED ABSENCE OF OTHER SPECIFIED PARTS OF DIGESTIVE TRACT: Chronic | ICD-10-CM

## 2023-02-10 DIAGNOSIS — M05.79 RHEUMATOID ARTHRITIS WITH RHEUMATOID FACTOR OF MULTIPLE SITES WITHOUT ORGAN OR SYSTEMS INVOLVEMENT: ICD-10-CM

## 2023-02-10 PROCEDURE — 73630 X-RAY EXAM OF FOOT: CPT | Mod: 26,50

## 2023-02-10 PROCEDURE — 73130 X-RAY EXAM OF HAND: CPT

## 2023-02-10 PROCEDURE — 73130 X-RAY EXAM OF HAND: CPT | Mod: 26,50

## 2023-02-10 PROCEDURE — 73630 X-RAY EXAM OF FOOT: CPT

## 2023-02-28 ENCOUNTER — RX RENEWAL (OUTPATIENT)
Age: 63
End: 2023-02-28

## 2023-03-09 ENCOUNTER — APPOINTMENT (OUTPATIENT)
Dept: RADIOLOGY | Facility: CLINIC | Age: 63
End: 2023-03-09
Payer: COMMERCIAL

## 2023-03-09 ENCOUNTER — OUTPATIENT (OUTPATIENT)
Dept: OUTPATIENT SERVICES | Facility: HOSPITAL | Age: 63
LOS: 1 days | End: 2023-03-09
Payer: COMMERCIAL

## 2023-03-09 ENCOUNTER — APPOINTMENT (OUTPATIENT)
Dept: MAMMOGRAPHY | Facility: CLINIC | Age: 63
End: 2023-03-09
Payer: COMMERCIAL

## 2023-03-09 DIAGNOSIS — Z90.49 ACQUIRED ABSENCE OF OTHER SPECIFIED PARTS OF DIGESTIVE TRACT: Chronic | ICD-10-CM

## 2023-03-09 DIAGNOSIS — Z13.820 ENCOUNTER FOR SCREENING FOR OSTEOPOROSIS: ICD-10-CM

## 2023-03-09 DIAGNOSIS — Z12.31 ENCOUNTER FOR SCREENING MAMMOGRAM FOR MALIGNANT NEOPLASM OF BREAST: ICD-10-CM

## 2023-03-09 PROCEDURE — 77063 BREAST TOMOSYNTHESIS BI: CPT | Mod: 26

## 2023-03-09 PROCEDURE — 77080 DXA BONE DENSITY AXIAL: CPT | Mod: 26

## 2023-03-09 PROCEDURE — 77067 SCR MAMMO BI INCL CAD: CPT

## 2023-03-09 PROCEDURE — 77067 SCR MAMMO BI INCL CAD: CPT | Mod: 26

## 2023-03-09 PROCEDURE — 77063 BREAST TOMOSYNTHESIS BI: CPT

## 2023-03-09 PROCEDURE — 77080 DXA BONE DENSITY AXIAL: CPT

## 2023-03-28 ENCOUNTER — OUTPATIENT (OUTPATIENT)
Dept: OUTPATIENT SERVICES | Facility: HOSPITAL | Age: 63
LOS: 1 days | End: 2023-03-28
Payer: COMMERCIAL

## 2023-03-28 ENCOUNTER — APPOINTMENT (OUTPATIENT)
Dept: RHEUMATOLOGY | Facility: CLINIC | Age: 63
End: 2023-03-28
Payer: COMMERCIAL

## 2023-03-28 ENCOUNTER — APPOINTMENT (OUTPATIENT)
Dept: ULTRASOUND IMAGING | Facility: CLINIC | Age: 63
End: 2023-03-28
Payer: COMMERCIAL

## 2023-03-28 VITALS
WEIGHT: 170 LBS | HEIGHT: 61 IN | SYSTOLIC BLOOD PRESSURE: 120 MMHG | OXYGEN SATURATION: 99 % | BODY MASS INDEX: 32.1 KG/M2 | HEART RATE: 75 BPM | DIASTOLIC BLOOD PRESSURE: 80 MMHG | RESPIRATION RATE: 17 BRPM | TEMPERATURE: 98 F

## 2023-03-28 DIAGNOSIS — R92.8 OTHER ABNORMAL AND INCONCLUSIVE FINDINGS ON DIAGNOSTIC IMAGING OF BREAST: ICD-10-CM

## 2023-03-28 DIAGNOSIS — Z90.49 ACQUIRED ABSENCE OF OTHER SPECIFIED PARTS OF DIGESTIVE TRACT: Chronic | ICD-10-CM

## 2023-03-28 PROCEDURE — 76642 ULTRASOUND BREAST LIMITED: CPT | Mod: 26,RT

## 2023-03-28 PROCEDURE — 99214 OFFICE O/P EST MOD 30 MIN: CPT

## 2023-03-28 PROCEDURE — 76642 ULTRASOUND BREAST LIMITED: CPT

## 2023-03-28 RX ORDER — CELECOXIB 200 MG/1
200 CAPSULE ORAL DAILY
Qty: 90 | Refills: 1 | Status: ACTIVE | COMMUNITY
Start: 2018-10-11 | End: 1900-01-01

## 2023-03-28 NOTE — HISTORY OF PRESENT ILLNESS
[Arthralgias] : arthralgias [FreeTextEntry1] : Continues to feel fine overall.  No joint pain, swelling, or AM stiffness.  Only complaint is occasional "bad days", primarily after overexertion.  No new complaints.\par  [Anorexia] : no anorexia [Weight Loss] : no weight loss [Malaise] : no malaise [Fever] : no fever [Chills] : no chills [Fatigue] : no fatigue [Malar Facial Rash] : no malar facial rash [Skin Lesions] : no lesions [Skin Nodules] : no skin nodules [Oral Ulcers] : no oral ulcers [Cough] : no cough [Dry Mouth] : no dry mouth [Dysphagia] : no dysphagia [Shortness of Breath] : no shortness of breath [Chest Pain] : no chest pain [Joint Swelling] : no joint swelling [Joint Warmth] : no joint warmth [Joint Deformity] : no joint deformity [Decreased ROM] : no decreased range of motion [Morning Stiffness] : no morning stiffness [Falls] : no falls [Difficulty Standing] : no difficulty standing [Difficulty Walking] : no difficulty walking [Dyspnea] : no dyspnea [Myalgias] : no myalgias [Muscle Weakness] : no muscle weakness [Muscle Spasms] : no muscle spasms [Muscle Cramping] : no muscle cramping [Visual Changes] : no visual changes [Eye Pain] : no eye pain [Eye Redness] : no eye redness [Dry Eyes] : no dry eyes

## 2023-03-28 NOTE — ASSESSMENT
[FreeTextEntry1] : 62 year old female with:\par 1)  Long-standing RA (+RF, +CCP), clinically well controlled on MTX 12.5mg weekly.  Also w/ persistently elevated ESR/CRP.\par   - Cont MTX 12.5mg weekly, folic acid 1mg daily.\par   - Hepatitis panel negative 10/18.\par   - Flu vaccine (9/22).  Prevnar (7/19), Pneumovax (12/20) UTD.  Pt has received the COVID19 vaccine + booster x 2+ bivalent booster. \par   - Check labs.\par 2)  Pain in B/L heels:  most c/w plantar fasciitis - resolved\par   - heel stretching exercises\par   - ibuprofen prn\par   - roll frozen water bottle under heels prn\par 3)  Pain in shoulders  most c/w RTC tendinopathy.  resolved.\par   - shoulder exercises\par   - ibuprofen prn\par 4)  Borderline DAVIN:  no si/sx of other CTD and all serologies negative - ?due to RA\par 5)  Recent CXR by PMD revealed a 2.5cm opacity of unclear etiology.  CT revealed benign lesion.\par 6)  Persistently elevated ESR:  unlikely due to RA, as pt well controlled clinically\par   - Per pt, PMD performed w/u which was unremarkable.\par 7)  Pain in knees:  due to OA.  currently w/ pain in left knee only.\par   - Reiterated importance of exercise\par   - ibuprofen and/or Tylenol prn\par   - wt loss\par   - warm compresses\par   - OTC topical analgesics\par 8)  Pain in lateral left foot:  unclear etiology - ?strain.  no evidence of inflammatory process.  Now resolved.\par 9)  Osteopenia:  Bisphosphonate

## 2023-06-09 LAB
25(OH)D3 SERPL-MCNC: 34.5 NG/ML
ALBUMIN SERPL ELPH-MCNC: 4.3 G/DL
ALP BLD-CCNC: 64 U/L
ALT SERPL-CCNC: 18 U/L
ANION GAP SERPL CALC-SCNC: 14 MMOL/L
AST SERPL-CCNC: 21 U/L
BILIRUB SERPL-MCNC: 0.4 MG/DL
BUN SERPL-MCNC: 9 MG/DL
CALCIUM SERPL-MCNC: 9.4 MG/DL
CHLORIDE SERPL-SCNC: 100 MMOL/L
CO2 SERPL-SCNC: 26 MMOL/L
CREAT SERPL-MCNC: 0.53 MG/DL
CRP SERPL-MCNC: 10 MG/L
EGFR: 105 ML/MIN/1.73M2
ERYTHROCYTE [SEDIMENTATION RATE] IN BLOOD BY WESTERGREN METHOD: 120 MM/HR
GLUCOSE SERPL-MCNC: 99 MG/DL
POTASSIUM SERPL-SCNC: 5.2 MMOL/L
PROT SERPL-MCNC: 7.2 G/DL
SODIUM SERPL-SCNC: 140 MMOL/L

## 2023-06-15 ENCOUNTER — APPOINTMENT (OUTPATIENT)
Dept: RHEUMATOLOGY | Facility: CLINIC | Age: 63
End: 2023-06-15
Payer: COMMERCIAL

## 2023-06-15 VITALS
SYSTOLIC BLOOD PRESSURE: 118 MMHG | OXYGEN SATURATION: 98 % | DIASTOLIC BLOOD PRESSURE: 76 MMHG | HEIGHT: 61 IN | TEMPERATURE: 97.8 F | HEART RATE: 50 BPM

## 2023-06-15 PROCEDURE — 99214 OFFICE O/P EST MOD 30 MIN: CPT

## 2023-06-15 NOTE — PHYSICAL EXAM
[General Appearance - Alert] : alert [General Appearance - In No Acute Distress] : in no acute distress [Sclera] : the sclera and conjunctiva were normal [Outer Ear] : the ears and nose were normal in appearance [Oropharynx] : the oropharynx was normal [Neck Appearance] : the appearance of the neck was normal [Neck Cervical Mass (___cm)] : no neck mass was observed [Jugular Venous Distention Increased] : there was no jugular-venous distention [Thyroid Diffuse Enlargement] : the thyroid was not enlarged [Thyroid Nodule] : there were no palpable thyroid nodules [Auscultation Breath Sounds / Voice Sounds] : lungs were clear to auscultation bilaterally [Heart Rate And Rhythm] : heart rate was normal and rhythm regular [Heart Sounds] : normal S1 and S2 [Heart Sounds Gallop] : no gallops [Murmurs] : no murmurs [Heart Sounds Pericardial Friction Rub] : no pericardial rub [Edema] : there was no peripheral edema [Bowel Sounds] : normal bowel sounds [Abdomen Soft] : soft [Abdomen Tenderness] : non-tender [Abdomen Mass (___ Cm)] : no abdominal mass palpated [Cervical Lymph Nodes Enlarged Posterior Bilaterally] : posterior cervical [Cervical Lymph Nodes Enlarged Anterior Bilaterally] : anterior cervical [Supraclavicular Lymph Nodes Enlarged Bilaterally] : supraclavicular [No Spinal Tenderness] : no spinal tenderness [Skin Color & Pigmentation] : normal skin color and pigmentation [Skin Turgor] : normal skin turgor [] : no rash [No Focal Deficits] : no focal deficits [Oriented To Time, Place, And Person] : oriented to person, place, and time [Impaired Insight] : insight and judgment were intact [Affect] : the affect was normal [FreeTextEntry1] : No synovitis;  (+)tenderness over dorsum of left hand; normal ROM in all joints

## 2023-06-15 NOTE — HISTORY OF PRESENT ILLNESS
[Arthralgias] : arthralgias [FreeTextEntry1] : Pt c/o pain n her left hand for the past week though already improving.  Otherwise,  continues to feel fine overall.  No joint pain, swelling, or AM stiffness.  No new complaints.\par  [Anorexia] : no anorexia [Weight Loss] : no weight loss [Malaise] : no malaise [Fever] : no fever [Chills] : no chills [Fatigue] : no fatigue [Malar Facial Rash] : no malar facial rash [Skin Lesions] : no lesions [Skin Nodules] : no skin nodules [Oral Ulcers] : no oral ulcers [Cough] : no cough [Dry Mouth] : no dry mouth [Dysphagia] : no dysphagia [Shortness of Breath] : no shortness of breath [Chest Pain] : no chest pain [Joint Swelling] : no joint swelling [Joint Warmth] : no joint warmth [Joint Deformity] : no joint deformity [Decreased ROM] : no decreased range of motion [Morning Stiffness] : no morning stiffness [Falls] : no falls [Difficulty Standing] : no difficulty standing [Difficulty Walking] : no difficulty walking [Dyspnea] : no dyspnea [Myalgias] : no myalgias [Muscle Weakness] : no muscle weakness [Muscle Spasms] : no muscle spasms [Muscle Cramping] : no muscle cramping [Visual Changes] : no visual changes [Eye Pain] : no eye pain [Eye Redness] : no eye redness [Dry Eyes] : no dry eyes

## 2023-06-15 NOTE — ASSESSMENT
[FreeTextEntry1] : 63 year old female with:\par 1)  Long-standing RA (+RF, +CCP), clinically well controlled on MTX 12.5mg weekly.  Also w/ persistently elevated ESR/CRP.\par   - Cont MTX 12.5mg weekly, folic acid 1mg daily.\par   - Hepatitis panel negative 10/18.\par   - Flu vaccine (9/22).  Prevnar (7/19), Pneumovax (12/20) UTD.  Pt has received the COVID19 vaccine + booster x 2+ bivalent booster. \par   - Repeat labs prior to next visit.\par 2)  Pain in B/L heels:  most c/w plantar fasciitis - resolved\par   - heel stretching exercises\par   - ibuprofen prn\par   - roll frozen water bottle under heels prn\par 3)  Pain in shoulders  most c/w RTC tendinopathy.  resolved.\par   - shoulder exercises\par   - ibuprofen prn\par 4)  Borderline DAVIN:  no si/sx of other CTD and all serologies negative - ?due to RA\par 5)  Recent CXR by PMD revealed a 2.5cm opacity of unclear etiology.  CT revealed benign lesion.\par 6)  Persistently elevated ESR:  unlikely due to RA, as pt well controlled clinically\par   - Per pt, PMD performed w/u which was unremarkable.\par 7)  Pain in knees:  due to OA.  currently asymptomatic.\par   - Reiterated importance of exercise\par   - ibuprofen and/or Tylenol prn\par   - wt loss\par   - warm compresses\par   - OTC topical analgesics\par 8)  Pain in lateral left foot:  unclear etiology - ?strain.  no evidence of inflammatory process.  Now resolved.\par 9)  Osteopenia:  Bisphosphonate not indicated based on FRAX.\par  - Cont calcium / vit D supplementation.\par   - Weight bearing exercise\par 10)  Pain in left hand:  ?extensor tendonitis, as pain occurs primarily over dorsum of hand\par   - Recommended had exercise\par   - ibuprofen prn\par   - heating pads\par   - Voltaren gel prn

## 2023-09-28 ENCOUNTER — APPOINTMENT (OUTPATIENT)
Dept: RHEUMATOLOGY | Facility: CLINIC | Age: 63
End: 2023-09-28
Payer: COMMERCIAL

## 2023-09-28 VITALS
OXYGEN SATURATION: 79 % | BODY MASS INDEX: 32.1 KG/M2 | HEIGHT: 61 IN | DIASTOLIC BLOOD PRESSURE: 80 MMHG | RESPIRATION RATE: 17 BRPM | WEIGHT: 170 LBS | HEART RATE: 55 BPM | SYSTOLIC BLOOD PRESSURE: 120 MMHG | TEMPERATURE: 98 F

## 2023-09-28 PROCEDURE — 99214 OFFICE O/P EST MOD 30 MIN: CPT

## 2023-10-16 ENCOUNTER — OUTPATIENT (OUTPATIENT)
Dept: OUTPATIENT SERVICES | Facility: HOSPITAL | Age: 63
LOS: 1 days | End: 2023-10-16
Payer: COMMERCIAL

## 2023-10-16 ENCOUNTER — APPOINTMENT (OUTPATIENT)
Dept: MAMMOGRAPHY | Facility: CLINIC | Age: 63
End: 2023-10-16
Payer: COMMERCIAL

## 2023-10-16 DIAGNOSIS — Z90.49 ACQUIRED ABSENCE OF OTHER SPECIFIED PARTS OF DIGESTIVE TRACT: Chronic | ICD-10-CM

## 2023-10-16 DIAGNOSIS — R92.8 OTHER ABNORMAL AND INCONCLUSIVE FINDINGS ON DIAGNOSTIC IMAGING OF BREAST: ICD-10-CM

## 2023-10-16 PROCEDURE — G0279: CPT

## 2023-10-16 PROCEDURE — 77065 DX MAMMO INCL CAD UNI: CPT | Mod: 26,RT

## 2023-10-16 PROCEDURE — 77065 DX MAMMO INCL CAD UNI: CPT

## 2023-10-16 PROCEDURE — G0279: CPT | Mod: 26

## 2023-10-18 ENCOUNTER — APPOINTMENT (OUTPATIENT)
Dept: UROLOGY | Facility: CLINIC | Age: 63
End: 2023-10-18
Payer: COMMERCIAL

## 2023-10-18 ENCOUNTER — OUTPATIENT (OUTPATIENT)
Dept: OUTPATIENT SERVICES | Facility: HOSPITAL | Age: 63
LOS: 1 days | End: 2023-10-18
Payer: COMMERCIAL

## 2023-10-18 DIAGNOSIS — N13.5 CROSSING VESSEL AND STRICTURE OF URETER W/OUT HYDRONEPHROSIS: ICD-10-CM

## 2023-10-18 DIAGNOSIS — Z90.49 ACQUIRED ABSENCE OF OTHER SPECIFIED PARTS OF DIGESTIVE TRACT: Chronic | ICD-10-CM

## 2023-10-18 DIAGNOSIS — R35.0 FREQUENCY OF MICTURITION: ICD-10-CM

## 2023-10-18 PROCEDURE — 99212 OFFICE O/P EST SF 10 MIN: CPT

## 2023-10-18 PROCEDURE — 76775 US EXAM ABDO BACK WALL LIM: CPT | Mod: 26

## 2023-10-18 PROCEDURE — 76775 US EXAM ABDO BACK WALL LIM: CPT

## 2023-10-21 PROBLEM — N13.5 URETEROPELVIC JUNCTION (UPJ) OBSTRUCTION, LEFT: Status: ACTIVE | Noted: 2020-09-09

## 2023-10-23 DIAGNOSIS — N13.5 CROSSING VESSEL AND STRICTURE OF URETER WITHOUT HYDRONEPHROSIS: ICD-10-CM

## 2023-12-28 ENCOUNTER — APPOINTMENT (OUTPATIENT)
Dept: RHEUMATOLOGY | Facility: CLINIC | Age: 63
End: 2023-12-28

## 2024-02-20 NOTE — ED ADULT TRIAGE NOTE - HEART RATE (BEATS/MIN)
----- Message from Aminah Malik sent at 2/20/2024 11:11 AM CST -----  Contact: Mom  Mom is calling concerning appointment that was made and need to speak with a nurse as soon as possible concerning this appointment. Please callback [phone]    
[phone]

## 2024-03-13 LAB
ALBUMIN SERPL ELPH-MCNC: 4.3 G/DL
ALP BLD-CCNC: 68 U/L
ALT SERPL-CCNC: 15 U/L
ANION GAP SERPL CALC-SCNC: 10 MMOL/L
AST SERPL-CCNC: 16 U/L
BASOPHILS # BLD AUTO: 0.03 K/UL
BASOPHILS NFR BLD AUTO: 0.4 %
BILIRUB SERPL-MCNC: 0.3 MG/DL
BUN SERPL-MCNC: 12 MG/DL
CALCIUM SERPL-MCNC: 9.2 MG/DL
CHLORIDE SERPL-SCNC: 101 MMOL/L
CO2 SERPL-SCNC: 27 MMOL/L
CREAT SERPL-MCNC: 0.8 MG/DL
CRP SERPL-MCNC: 10 MG/L
EGFR: 83 ML/MIN/1.73M2
EOSINOPHIL # BLD AUTO: 0.17 K/UL
EOSINOPHIL NFR BLD AUTO: 2.3 %
ERYTHROCYTE [SEDIMENTATION RATE] IN BLOOD BY WESTERGREN METHOD: 110 MM/HR
GLUCOSE SERPL-MCNC: 101 MG/DL
HCT VFR BLD CALC: 40.6 %
HGB BLD-MCNC: 12.8 G/DL
IMM GRANULOCYTES NFR BLD AUTO: 0.1 %
LYMPHOCYTES # BLD AUTO: 2.63 K/UL
LYMPHOCYTES NFR BLD AUTO: 36 %
MAN DIFF?: NORMAL
MCHC RBC-ENTMCNC: 26.9 PG
MCHC RBC-ENTMCNC: 31.5 GM/DL
MCV RBC AUTO: 85.5 FL
MONOCYTES # BLD AUTO: 0.68 K/UL
MONOCYTES NFR BLD AUTO: 9.3 %
NEUTROPHILS # BLD AUTO: 3.78 K/UL
NEUTROPHILS NFR BLD AUTO: 51.9 %
PLATELET # BLD AUTO: 334 K/UL
POTASSIUM SERPL-SCNC: 4.2 MMOL/L
PROT SERPL-MCNC: 7.6 G/DL
RBC # BLD: 4.75 M/UL
RBC # FLD: 16.5 %
SODIUM SERPL-SCNC: 138 MMOL/L
WBC # FLD AUTO: 7.3 K/UL

## 2024-03-19 ENCOUNTER — APPOINTMENT (OUTPATIENT)
Dept: RHEUMATOLOGY | Facility: CLINIC | Age: 64
End: 2024-03-19
Payer: COMMERCIAL

## 2024-03-19 VITALS
SYSTOLIC BLOOD PRESSURE: 140 MMHG | OXYGEN SATURATION: 96 % | TEMPERATURE: 97.9 F | HEIGHT: 61 IN | BODY MASS INDEX: 32.47 KG/M2 | HEART RATE: 64 BPM | RESPIRATION RATE: 17 BRPM | WEIGHT: 172 LBS | DIASTOLIC BLOOD PRESSURE: 98 MMHG

## 2024-03-19 PROCEDURE — 99214 OFFICE O/P EST MOD 30 MIN: CPT

## 2024-03-19 PROCEDURE — G2211 COMPLEX E/M VISIT ADD ON: CPT

## 2024-03-19 NOTE — PHYSICAL EXAM
[General Appearance - In No Acute Distress] : in no acute distress [General Appearance - Alert] : alert [Sclera] : the sclera and conjunctiva were normal [Outer Ear] : the ears and nose were normal in appearance [Oropharynx] : the oropharynx was normal [Neck Appearance] : the appearance of the neck was normal [Neck Cervical Mass (___cm)] : no neck mass was observed [Jugular Venous Distention Increased] : there was no jugular-venous distention [Thyroid Diffuse Enlargement] : the thyroid was not enlarged [Thyroid Nodule] : there were no palpable thyroid nodules [Heart Rate And Rhythm] : heart rate was normal and rhythm regular [Auscultation Breath Sounds / Voice Sounds] : lungs were clear to auscultation bilaterally [Heart Sounds Gallop] : no gallops [Heart Sounds] : normal S1 and S2 [Murmurs] : no murmurs [Heart Sounds Pericardial Friction Rub] : no pericardial rub [Edema] : there was no peripheral edema [Bowel Sounds] : normal bowel sounds [Abdomen Soft] : soft [Abdomen Tenderness] : non-tender [Abdomen Mass (___ Cm)] : no abdominal mass palpated [Cervical Lymph Nodes Enlarged Posterior Bilaterally] : posterior cervical [Supraclavicular Lymph Nodes Enlarged Bilaterally] : supraclavicular [Cervical Lymph Nodes Enlarged Anterior Bilaterally] : anterior cervical [No Spinal Tenderness] : no spinal tenderness [Skin Color & Pigmentation] : normal skin color and pigmentation [Skin Turgor] : normal skin turgor [No Focal Deficits] : no focal deficits [] : no rash [Oriented To Time, Place, And Person] : oriented to person, place, and time [Impaired Insight] : insight and judgment were intact [Affect] : the affect was normal [FreeTextEntry1] : No synovitis;  normal ROM in all joints

## 2024-03-19 NOTE — ASSESSMENT
[FreeTextEntry1] : 58 year old female with: 1)  Long-standing RA (+RF, +CCP), clinically well controlled on MTX.      - Cont MTX 12.5mg weekly, folic acid 1mg daily.   - Hepatitis panel negative 10/18.   - Flu vaccine (9/23), Prevnar (7/19), Pneumovax (12/20) UTD.  Pt has received the COVID19 vaccine + booster x 2.     - Repeat labs prior to next visit. 2)  Pain in B/L heels:  most c/w plantar fasciitis - resolved   - heel stretching exercises   - ibuprofen prn   - roll frozen water bottle under heels prn 3)  Pain in shoulders  most c/w RTC tendinopathy.  currently resolved   - shoulder exercises   - ibuprofen prn 4)  Borderline DAVIN:  no si/sx of other CTD and all serologies negative - ?due to RA 5)  Recent CXR by PMD revealed a 2.5cm opacity of unclear etiology.  CT revealed benign lesion. 6)  Persistently elevated ESR:  unlikely due to RA, as pt well controlled clinically   - Per pt, PMD performed w/u which was unremarkable.   - Cont to monitor 7)  Pain in knees:  due to OA.  now resolved.   - Reiterated importance of exercise   - ibuprofen and/or Tylenol prn   - wt loss   - warm compresses   - OTC topical analgesics 8)  Pain in lateral left foot:  unclear etiology - ?strain.  no evidence of inflammatory process.  Now resolved.

## 2024-03-19 NOTE — HISTORY OF PRESENT ILLNESS
[Arthralgias] : arthralgias [Anorexia] : no anorexia [FreeTextEntry1] : Feeling fine overall since last visit.  No joint pain/swelling currently.  (+)AM stiffness x 5 minutes.   She c/o 1 mild flare several months ago that resolved after 2-3 days. No current complaints.  [Weight Loss] : no weight loss [Malaise] : no malaise [Fever] : no fever [Chills] : no chills [Fatigue] : no fatigue [Malar Facial Rash] : no malar facial rash [Skin Lesions] : no lesions [Skin Nodules] : no skin nodules [Oral Ulcers] : no oral ulcers [Cough] : no cough [Dry Mouth] : no dry mouth [Dysphagia] : no dysphagia [Shortness of Breath] : no shortness of breath [Chest Pain] : no chest pain [Joint Swelling] : no joint swelling [Joint Warmth] : no joint warmth [Joint Deformity] : no joint deformity [Decreased ROM] : no decreased range of motion [Morning Stiffness] : no morning stiffness [Falls] : no falls [Difficulty Standing] : no difficulty standing [Difficulty Walking] : no difficulty walking [Myalgias] : no myalgias [Dyspnea] : no dyspnea [Muscle Weakness] : no muscle weakness [Muscle Cramping] : no muscle cramping [Muscle Spasms] : no muscle spasms [Eye Pain] : no eye pain [Visual Changes] : no visual changes [Eye Redness] : no eye redness [Dry Eyes] : no dry eyes

## 2024-06-11 ENCOUNTER — APPOINTMENT (OUTPATIENT)
Dept: RADIOLOGY | Facility: CLINIC | Age: 64
End: 2024-06-11

## 2024-06-13 ENCOUNTER — LABORATORY RESULT (OUTPATIENT)
Age: 64
End: 2024-06-13

## 2024-06-14 LAB
ALBUMIN SERPL ELPH-MCNC: 4.4 G/DL
ALP BLD-CCNC: 71 U/L
ALT SERPL-CCNC: 15 U/L
ANION GAP SERPL CALC-SCNC: 12 MMOL/L
AST SERPL-CCNC: 21 U/L
BASOPHILS # BLD AUTO: 0.08 K/UL
BASOPHILS NFR BLD AUTO: 1.3 %
BILIRUB SERPL-MCNC: 0.4 MG/DL
BUN SERPL-MCNC: 16 MG/DL
CALCIUM SERPL-MCNC: 9.4 MG/DL
CHLORIDE SERPL-SCNC: 102 MMOL/L
CO2 SERPL-SCNC: 25 MMOL/L
CREAT SERPL-MCNC: 0.91 MG/DL
CRP SERPL-MCNC: 12 MG/L
DEPRECATED KAPPA LC FREE/LAMBDA SER: 1.27 RATIO
EGFR: 71 ML/MIN/1.73M2
EOSINOPHIL # BLD AUTO: 0.34 K/UL
EOSINOPHIL NFR BLD AUTO: 5.4 %
ERYTHROCYTE [SEDIMENTATION RATE] IN BLOOD BY WESTERGREN METHOD: 53 MM/HR
GLUCOSE SERPL-MCNC: 101 MG/DL
HCT VFR BLD CALC: 42.4 %
HGB BLD-MCNC: 13 G/DL
IGA SER QL IEP: 275 MG/DL
IGG SER QL IEP: 1373 MG/DL
IGM SER QL IEP: 124 MG/DL
IMM GRANULOCYTES NFR BLD AUTO: 0.3 %
KAPPA LC CSF-MCNC: 2.02 MG/DL
KAPPA LC SERPL-MCNC: 2.57 MG/DL
LYMPHOCYTES # BLD AUTO: 2.41 K/UL
LYMPHOCYTES NFR BLD AUTO: 38.1 %
MAN DIFF?: NORMAL
MCHC RBC-ENTMCNC: 26.5 PG
MCHC RBC-ENTMCNC: 30.7 GM/DL
MCV RBC AUTO: 86.4 FL
MONOCYTES # BLD AUTO: 0.79 K/UL
MONOCYTES NFR BLD AUTO: 12.5 %
NEUTROPHILS # BLD AUTO: 2.69 K/UL
NEUTROPHILS NFR BLD AUTO: 42.4 %
PLATELET # BLD AUTO: 308 K/UL
POTASSIUM SERPL-SCNC: 4.7 MMOL/L
PROT SERPL-MCNC: 7.5 G/DL
RBC # BLD: 4.91 M/UL
RBC # FLD: 15.5 %
SODIUM SERPL-SCNC: 139 MMOL/L
WBC # FLD AUTO: 6.33 K/UL

## 2024-06-17 LAB
ALBUMIN MFR SERPL ELPH: 54 %
ALBUMIN SERPL-MCNC: 4 G/DL
ALBUMIN/GLOB SERPL: 1.1 RATIO
ALPHA1 GLOB MFR SERPL ELPH: 4.1 %
ALPHA1 GLOB SERPL ELPH-MCNC: 0.3 G/DL
ALPHA2 GLOB MFR SERPL ELPH: 11.6 %
ALPHA2 GLOB SERPL ELPH-MCNC: 0.9 G/DL
B-GLOBULIN MFR SERPL ELPH: 12.7 %
B-GLOBULIN SERPL ELPH-MCNC: 1 G/DL
GAMMA GLOB FLD ELPH-MCNC: 1.3 G/DL
GAMMA GLOB MFR SERPL ELPH: 17.6 %
INTERPRETATION SERPL IEP-IMP: NORMAL
M PROTEIN SPEC IFE-MCNC: NORMAL
PROT SERPL-MCNC: 7.5 G/DL
PROT SERPL-MCNC: 7.5 G/DL

## 2024-06-25 ENCOUNTER — APPOINTMENT (OUTPATIENT)
Dept: RHEUMATOLOGY | Facility: CLINIC | Age: 64
End: 2024-06-25
Payer: COMMERCIAL

## 2024-06-25 VITALS
WEIGHT: 169 LBS | BODY MASS INDEX: 31.91 KG/M2 | HEIGHT: 61 IN | DIASTOLIC BLOOD PRESSURE: 100 MMHG | SYSTOLIC BLOOD PRESSURE: 142 MMHG | OXYGEN SATURATION: 98 % | HEART RATE: 55 BPM | TEMPERATURE: 97.5 F

## 2024-06-25 DIAGNOSIS — M85.80 OTHER SPECIFIED DISORDERS OF BONE DENSITY AND STRUCTURE, UNSPECIFIED SITE: ICD-10-CM

## 2024-06-25 DIAGNOSIS — R76.8 OTHER SPECIFIED ABNORMAL IMMUNOLOGICAL FINDINGS IN SERUM: ICD-10-CM

## 2024-06-25 DIAGNOSIS — M72.2 PLANTAR FASCIAL FIBROMATOSIS: ICD-10-CM

## 2024-06-25 DIAGNOSIS — M15.9 POLYOSTEOARTHRITIS, UNSPECIFIED: ICD-10-CM

## 2024-06-25 DIAGNOSIS — M75.81 OTHER SHOULDER LESIONS, RIGHT SHOULDER: ICD-10-CM

## 2024-06-25 DIAGNOSIS — M05.79 RHEUMATOID ARTHRITIS WITH RHEUMATOID FACTOR OF MULTIPLE SITES W/OUT ORGAN OR SYSTEMS INVOLVEMENT: ICD-10-CM

## 2024-06-25 DIAGNOSIS — Z79.899 OTHER LONG TERM (CURRENT) DRUG THERAPY: ICD-10-CM

## 2024-06-25 PROCEDURE — G2211 COMPLEX E/M VISIT ADD ON: CPT

## 2024-06-25 PROCEDURE — 99214 OFFICE O/P EST MOD 30 MIN: CPT

## 2024-06-25 RX ORDER — METHOTREXATE 2.5 MG/1
2.5 TABLET ORAL
Qty: 60 | Refills: 0 | Status: ACTIVE | COMMUNITY
Start: 2022-08-16 | End: 1900-01-01

## 2024-06-25 RX ORDER — FOLIC ACID 1 MG/1
1 TABLET ORAL DAILY
Qty: 90 | Refills: 3 | Status: ACTIVE | COMMUNITY
Start: 2018-10-11 | End: 1900-01-01

## 2024-09-11 ENCOUNTER — RX RENEWAL (OUTPATIENT)
Age: 64
End: 2024-09-11

## 2024-09-24 ENCOUNTER — APPOINTMENT (OUTPATIENT)
Dept: RHEUMATOLOGY | Facility: CLINIC | Age: 64
End: 2024-09-24
Payer: COMMERCIAL

## 2024-09-24 VITALS
HEIGHT: 61 IN | SYSTOLIC BLOOD PRESSURE: 120 MMHG | WEIGHT: 170 LBS | RESPIRATION RATE: 17 BRPM | DIASTOLIC BLOOD PRESSURE: 86 MMHG | BODY MASS INDEX: 32.1 KG/M2 | OXYGEN SATURATION: 99 % | TEMPERATURE: 98 F | HEART RATE: 57 BPM

## 2024-09-24 DIAGNOSIS — M85.80 OTHER SPECIFIED DISORDERS OF BONE DENSITY AND STRUCTURE, UNSPECIFIED SITE: ICD-10-CM

## 2024-09-24 DIAGNOSIS — R76.8 OTHER SPECIFIED ABNORMAL IMMUNOLOGICAL FINDINGS IN SERUM: ICD-10-CM

## 2024-09-24 DIAGNOSIS — M75.81 OTHER SHOULDER LESIONS, RIGHT SHOULDER: ICD-10-CM

## 2024-09-24 DIAGNOSIS — Z79.899 OTHER LONG TERM (CURRENT) DRUG THERAPY: ICD-10-CM

## 2024-09-24 DIAGNOSIS — M72.2 PLANTAR FASCIAL FIBROMATOSIS: ICD-10-CM

## 2024-09-24 DIAGNOSIS — M15.0 PRIMARY GENERALIZED (OSTEO)ARTHRITIS: ICD-10-CM

## 2024-09-24 DIAGNOSIS — M05.79 RHEUMATOID ARTHRITIS WITH RHEUMATOID FACTOR OF MULTIPLE SITES W/OUT ORGAN OR SYSTEMS INVOLVEMENT: ICD-10-CM

## 2024-09-24 PROCEDURE — 99214 OFFICE O/P EST MOD 30 MIN: CPT

## 2024-09-24 PROCEDURE — G2211 COMPLEX E/M VISIT ADD ON: CPT | Mod: NC

## 2024-09-24 RX ORDER — LOSARTAN POTASSIUM AND HYDROCHLOROTHIAZIDE 12.5; 1 MG/1; MG/1
100-12.5 TABLET ORAL DAILY
Refills: 0 | Status: ACTIVE | COMMUNITY
Start: 2024-09-24

## 2024-09-24 NOTE — ASSESSMENT
[FreeTextEntry1] : 64 year old female with: 1)  Long-standing RA (+RF, +CCP), clinically well controlled on MTX.      - Cont MTX 12.5mg weekly, folic acid 1mg daily.   - Hepatitis panel negative 10/18.   - Flu vaccine (9/24, per pt), Prevnar (7/19), Pneumovax (12/20) UTD.  Pt has received the COVID19 vaccine + booster x 2.     - Repeat labs prior to next visit. 2)  Pain in B/L heels:  most c/w plantar fasciitis - resolved   - heel stretching exercises   - ibuprofen prn   - roll frozen water bottle under heels prn 3)  Pain in shoulders  most c/w RTC tendinopathy.  currently resolved   - shoulder exercises   - ibuprofen prn 4)  Borderline DAVIN:  no si/sx of other CTD and all serologies negative - ?due to RA 5)  Recent CXR by PMD revealed a 2.5cm opacity of unclear etiology.  CT revealed benign lesion. 6)  Persistently elevated ESR:  unlikely due to RA, as pt well controlled clinically   - Per pt, PMD performed w/u which was unremarkable.   - Cont to monitor   - Stressed importance of keeping up to date w/ routine age-appropriate malignancy screening. 7)  Pain in knees:  due to OA.  Mild.   - Reiterated importance of exercise   - ibuprofen and/or Tylenol prn   - wt loss   - warm compresses   - OTC topical analgesics 8)  Pain in lateral left foot:  unclear etiology - ?strain.  no evidence of inflammatory process.  Now resolved.

## 2024-09-24 NOTE — HISTORY OF PRESENT ILLNESS
[Arthralgias] : arthralgias [FreeTextEntry1] : 9/24/2024: Continues to feel fine since last visit.  Still w/ mild pain in her knees upon getting up after prolonged sitting which resolves after 1-2 minutes.   Otherwise, no joint pain/swelling or AM stiffness.  She also reports that last week, she experienced an episode of left-sided jaw pain, though it has already resolved.  No other complaints. 6/25/2024:  Continues to feel fine overall.  No joint pain/swelling currently.  Only complaint is mild pain in her knees upon getting up after prolonged sitting which resolves after 1-2 minutes.  No new complaints. 3/19/2024:  Feeling fine overall since last visit.  No joint pain/swelling currently.  (+)AM stiffness x 5 minutes.   She c/o 1 mild flare several months ago that resolved after 2-3 days. No current complaints.  [Anorexia] : no anorexia [Weight Loss] : no weight loss [Malaise] : no malaise [Fever] : no fever [Chills] : no chills [Fatigue] : no fatigue [Malar Facial Rash] : no malar facial rash [Skin Lesions] : no lesions [Skin Nodules] : no skin nodules [Oral Ulcers] : no oral ulcers [Cough] : no cough [Dry Mouth] : no dry mouth [Dysphagia] : no dysphagia [Shortness of Breath] : no shortness of breath [Chest Pain] : no chest pain [Joint Swelling] : no joint swelling [Joint Warmth] : no joint warmth [Joint Deformity] : no joint deformity [Decreased ROM] : no decreased range of motion [Morning Stiffness] : no morning stiffness [Falls] : no falls [Difficulty Standing] : no difficulty standing [Difficulty Walking] : no difficulty walking [Dyspnea] : no dyspnea [Myalgias] : no myalgias [Muscle Weakness] : no muscle weakness [Muscle Spasms] : no muscle spasms [Muscle Cramping] : no muscle cramping [Visual Changes] : no visual changes [Eye Pain] : no eye pain [Eye Redness] : no eye redness [Dry Eyes] : no dry eyes

## 2024-12-24 ENCOUNTER — APPOINTMENT (OUTPATIENT)
Dept: RHEUMATOLOGY | Facility: CLINIC | Age: 64
End: 2024-12-24
Payer: COMMERCIAL

## 2024-12-24 VITALS
WEIGHT: 170 LBS | BODY MASS INDEX: 32.1 KG/M2 | HEART RATE: 60 BPM | DIASTOLIC BLOOD PRESSURE: 80 MMHG | RESPIRATION RATE: 17 BRPM | HEIGHT: 61 IN | SYSTOLIC BLOOD PRESSURE: 122 MMHG | OXYGEN SATURATION: 96 %

## 2024-12-24 DIAGNOSIS — Z79.60 LONG TERM (CURRENT) USE OF UNSPECIFIED IMMUNOMODULATORS AND IMMUNOSUPPRESSANTS: ICD-10-CM

## 2024-12-24 DIAGNOSIS — M15.0 PRIMARY GENERALIZED (OSTEO)ARTHRITIS: ICD-10-CM

## 2024-12-24 DIAGNOSIS — M72.2 PLANTAR FASCIAL FIBROMATOSIS: ICD-10-CM

## 2024-12-24 DIAGNOSIS — M75.81 OTHER SHOULDER LESIONS, RIGHT SHOULDER: ICD-10-CM

## 2024-12-24 DIAGNOSIS — R76.8 OTHER SPECIFIED ABNORMAL IMMUNOLOGICAL FINDINGS IN SERUM: ICD-10-CM

## 2024-12-24 DIAGNOSIS — M85.80 OTHER SPECIFIED DISORDERS OF BONE DENSITY AND STRUCTURE, UNSPECIFIED SITE: ICD-10-CM

## 2024-12-24 DIAGNOSIS — M05.79 RHEUMATOID ARTHRITIS WITH RHEUMATOID FACTOR OF MULTIPLE SITES W/OUT ORGAN OR SYSTEMS INVOLVEMENT: ICD-10-CM

## 2024-12-24 PROCEDURE — G2211 COMPLEX E/M VISIT ADD ON: CPT | Mod: NC

## 2024-12-24 PROCEDURE — 99214 OFFICE O/P EST MOD 30 MIN: CPT

## 2025-03-21 ENCOUNTER — OUTPATIENT (OUTPATIENT)
Dept: OUTPATIENT SERVICES | Facility: HOSPITAL | Age: 65
LOS: 1 days | End: 2025-03-21
Payer: COMMERCIAL

## 2025-03-21 DIAGNOSIS — Z90.49 ACQUIRED ABSENCE OF OTHER SPECIFIED PARTS OF DIGESTIVE TRACT: Chronic | ICD-10-CM

## 2025-03-21 DIAGNOSIS — M05.79 RHEUMATOID ARTHRITIS WITH RHEUMATOID FACTOR OF MULTIPLE SITES WITHOUT ORGAN OR SYSTEMS INVOLVEMENT: ICD-10-CM

## 2025-03-21 PROCEDURE — 73130 X-RAY EXAM OF HAND: CPT | Mod: 26,50

## 2025-03-21 PROCEDURE — 73620 X-RAY EXAM OF FOOT: CPT | Mod: 26,50

## 2025-03-27 ENCOUNTER — NON-APPOINTMENT (OUTPATIENT)
Age: 65
End: 2025-03-27

## 2025-03-27 ENCOUNTER — APPOINTMENT (OUTPATIENT)
Dept: RHEUMATOLOGY | Facility: CLINIC | Age: 65
End: 2025-03-27
Payer: COMMERCIAL

## 2025-03-27 VITALS
BODY MASS INDEX: 32.1 KG/M2 | DIASTOLIC BLOOD PRESSURE: 84 MMHG | OXYGEN SATURATION: 99 % | HEART RATE: 66 BPM | SYSTOLIC BLOOD PRESSURE: 150 MMHG | TEMPERATURE: 97.6 F | WEIGHT: 170 LBS | HEIGHT: 61 IN | RESPIRATION RATE: 17 BRPM

## 2025-03-27 DIAGNOSIS — R76.8 OTHER SPECIFIED ABNORMAL IMMUNOLOGICAL FINDINGS IN SERUM: ICD-10-CM

## 2025-03-27 DIAGNOSIS — M05.79 RHEUMATOID ARTHRITIS WITH RHEUMATOID FACTOR OF MULTIPLE SITES W/OUT ORGAN OR SYSTEMS INVOLVEMENT: ICD-10-CM

## 2025-03-27 DIAGNOSIS — M85.80 OTHER SPECIFIED DISORDERS OF BONE DENSITY AND STRUCTURE, UNSPECIFIED SITE: ICD-10-CM

## 2025-03-27 DIAGNOSIS — M75.81 OTHER SHOULDER LESIONS, RIGHT SHOULDER: ICD-10-CM

## 2025-03-27 DIAGNOSIS — M72.2 PLANTAR FASCIAL FIBROMATOSIS: ICD-10-CM

## 2025-03-27 DIAGNOSIS — M15.0 PRIMARY GENERALIZED (OSTEO)ARTHRITIS: ICD-10-CM

## 2025-03-27 DIAGNOSIS — Z79.60 LONG TERM (CURRENT) USE OF UNSPECIFIED IMMUNOMODULATORS AND IMMUNOSUPPRESSANTS: ICD-10-CM

## 2025-03-27 PROCEDURE — 99214 OFFICE O/P EST MOD 30 MIN: CPT

## 2025-03-27 PROCEDURE — G2211 COMPLEX E/M VISIT ADD ON: CPT | Mod: NC

## 2025-06-12 ENCOUNTER — APPOINTMENT (OUTPATIENT)
Dept: MAMMOGRAPHY | Facility: CLINIC | Age: 65
End: 2025-06-12

## 2025-06-12 ENCOUNTER — OUTPATIENT (OUTPATIENT)
Dept: OUTPATIENT SERVICES | Facility: HOSPITAL | Age: 65
LOS: 1 days | End: 2025-06-12
Payer: COMMERCIAL

## 2025-06-12 DIAGNOSIS — Z90.49 ACQUIRED ABSENCE OF OTHER SPECIFIED PARTS OF DIGESTIVE TRACT: Chronic | ICD-10-CM

## 2025-06-12 DIAGNOSIS — Z12.31 ENCOUNTER FOR SCREENING MAMMOGRAM FOR MALIGNANT NEOPLASM OF BREAST: ICD-10-CM

## 2025-06-12 PROCEDURE — 76641 ULTRASOUND BREAST COMPLETE: CPT | Mod: 26,50

## 2025-06-12 PROCEDURE — 77066 DX MAMMO INCL CAD BI: CPT | Mod: 26

## 2025-06-12 PROCEDURE — G0279: CPT | Mod: 26

## 2025-06-12 PROCEDURE — G0279: CPT

## 2025-06-12 PROCEDURE — 76641 ULTRASOUND BREAST COMPLETE: CPT

## 2025-06-12 PROCEDURE — 77066 DX MAMMO INCL CAD BI: CPT

## 2025-07-02 ENCOUNTER — RX RENEWAL (OUTPATIENT)
Age: 65
End: 2025-07-02

## 2025-07-23 ENCOUNTER — APPOINTMENT (OUTPATIENT)
Dept: MAMMOGRAPHY | Facility: CLINIC | Age: 65
End: 2025-07-23
Payer: COMMERCIAL

## 2025-07-23 ENCOUNTER — RESULT REVIEW (OUTPATIENT)
Age: 65
End: 2025-07-23

## 2025-07-23 ENCOUNTER — OUTPATIENT (OUTPATIENT)
Dept: OUTPATIENT SERVICES | Facility: HOSPITAL | Age: 65
LOS: 1 days | End: 2025-07-23
Payer: COMMERCIAL

## 2025-07-23 ENCOUNTER — APPOINTMENT (OUTPATIENT)
Dept: ULTRASOUND IMAGING | Facility: CLINIC | Age: 65
End: 2025-07-23

## 2025-07-23 DIAGNOSIS — R92.8 OTHER ABNORMAL AND INCONCLUSIVE FINDINGS ON DIAGNOSTIC IMAGING OF BREAST: ICD-10-CM

## 2025-07-23 DIAGNOSIS — Z00.8 ENCOUNTER FOR OTHER GENERAL EXAMINATION: ICD-10-CM

## 2025-07-23 DIAGNOSIS — Z90.49 ACQUIRED ABSENCE OF OTHER SPECIFIED PARTS OF DIGESTIVE TRACT: Chronic | ICD-10-CM

## 2025-07-23 DIAGNOSIS — R92.2 INCONCLUSIVE MAMMOGRAM: ICD-10-CM

## 2025-07-23 PROCEDURE — 76098 X-RAY EXAM SURGICAL SPECIMEN: CPT | Mod: 26

## 2025-07-23 PROCEDURE — 19081 BX BREAST 1ST LESION STRTCTC: CPT

## 2025-07-23 PROCEDURE — 88360 TUMOR IMMUNOHISTOCHEM/MANUAL: CPT

## 2025-07-23 PROCEDURE — 19081 BX BREAST 1ST LESION STRTCTC: CPT | Mod: LT

## 2025-07-23 PROCEDURE — 88305 TISSUE EXAM BY PATHOLOGIST: CPT

## 2025-07-23 PROCEDURE — A4648: CPT

## 2025-07-23 PROCEDURE — 77065 DX MAMMO INCL CAD UNI: CPT

## 2025-07-23 PROCEDURE — 88377 M/PHMTRC ALYS ISHQUANT/SEMIQ: CPT | Mod: 26

## 2025-07-23 PROCEDURE — 77065 DX MAMMO INCL CAD UNI: CPT | Mod: 26,LT

## 2025-07-23 PROCEDURE — 19083 BX BREAST 1ST LESION US IMAG: CPT

## 2025-07-23 PROCEDURE — 88377 M/PHMTRC ALYS ISHQUANT/SEMIQ: CPT

## 2025-07-23 PROCEDURE — 88360 TUMOR IMMUNOHISTOCHEM/MANUAL: CPT | Mod: 26

## 2025-07-23 PROCEDURE — 19083 BX BREAST 1ST LESION US IMAG: CPT | Mod: LT

## 2025-07-23 PROCEDURE — 88305 TISSUE EXAM BY PATHOLOGIST: CPT | Mod: 26

## 2025-07-29 ENCOUNTER — APPOINTMENT (OUTPATIENT)
Dept: RHEUMATOLOGY | Facility: CLINIC | Age: 65
End: 2025-07-29
Payer: COMMERCIAL

## 2025-07-29 VITALS
RESPIRATION RATE: 17 BRPM | HEART RATE: 69 BPM | SYSTOLIC BLOOD PRESSURE: 108 MMHG | HEIGHT: 61 IN | DIASTOLIC BLOOD PRESSURE: 74 MMHG | OXYGEN SATURATION: 98 %

## 2025-07-29 DIAGNOSIS — M05.79 RHEUMATOID ARTHRITIS WITH RHEUMATOID FACTOR OF MULTIPLE SITES W/OUT ORGAN OR SYSTEMS INVOLVEMENT: ICD-10-CM

## 2025-07-29 DIAGNOSIS — M85.80 OTHER SPECIFIED DISORDERS OF BONE DENSITY AND STRUCTURE, UNSPECIFIED SITE: ICD-10-CM

## 2025-07-29 DIAGNOSIS — M72.2 PLANTAR FASCIAL FIBROMATOSIS: ICD-10-CM

## 2025-07-29 DIAGNOSIS — M75.81 OTHER SHOULDER LESIONS, RIGHT SHOULDER: ICD-10-CM

## 2025-07-29 DIAGNOSIS — M15.0 PRIMARY GENERALIZED (OSTEO)ARTHRITIS: ICD-10-CM

## 2025-07-29 DIAGNOSIS — R76.8 OTHER SPECIFIED ABNORMAL IMMUNOLOGICAL FINDINGS IN SERUM: ICD-10-CM

## 2025-07-29 PROCEDURE — 99214 OFFICE O/P EST MOD 30 MIN: CPT

## 2025-07-29 PROCEDURE — G2211 COMPLEX E/M VISIT ADD ON: CPT | Mod: NC
